# Patient Record
Sex: FEMALE | Race: WHITE | Employment: UNEMPLOYED | URBAN - METROPOLITAN AREA
[De-identification: names, ages, dates, MRNs, and addresses within clinical notes are randomized per-mention and may not be internally consistent; named-entity substitution may affect disease eponyms.]

---

## 2017-08-14 ENCOUNTER — HOSPITAL ENCOUNTER (OUTPATIENT)
Dept: PHYSICAL THERAPY | Age: 52
Discharge: HOME | End: 2017-08-14

## 2017-08-14 ENCOUNTER — HOSPITAL ENCOUNTER (OUTPATIENT)
Dept: NON INVASIVE DIAGNOSTICS | Age: 52
Discharge: HOME | End: 2017-08-14
Attending: EMERGENCY MEDICINE

## 2017-08-14 DIAGNOSIS — I44.7 OTHER LEFT BUNDLE BRANCH BLOCK: ICD-10-CM

## 2017-08-14 PROCEDURE — 97163 PT EVAL HIGH COMPLEX 45 MIN: CPT | Performed by: PHYSICAL THERAPIST

## 2017-08-14 PROCEDURE — 93306 TTE W/DOPPLER COMPLETE: CPT
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
## 2017-08-17 ENCOUNTER — HOSPITAL ENCOUNTER (OUTPATIENT)
Dept: PHYSICAL THERAPY | Age: 52
Discharge: HOME | End: 2017-08-17

## 2017-08-17 PROCEDURE — 97110 THERAPEUTIC EXERCISES: CPT

## 2017-08-17 NOTE — PROGRESS NOTES
PT DAILY TREATMENT NOTE - [facility]     Patient Name: Jt King  Date:2017  : 1965  [x]  Patient  Verified  Payor: /    In time:525  Out time:605  Total Treatment Time (min): 40  Total Timed Codes (min): 30  1:1 Treatment Time ( [address] only): na   Visit #: 2 of 16    Treatment Area: Pain in right knee [M25.561]    SUBJECTIVE  Pain Level (0-10 scale): 5,   Any medication changes, allergies to medications, adverse drug reactions, diagnosis change, or new procedure performed?: [x] No    [] Yes (see summary sheet for update)  Subjective functional status/changes:     [x] No changes reported  Active with HEP.     OBJECTIVE    Modality rationale: decrease pain    Min Type Additional Details    [] Estim:  []Unatt       []IFC  []Premod                        []Other:  []w/ice   []w/heat  Position:  Location:    [] Estim: []Att    []TENS instruct  []NMES                    []Other:  []w/US   []w/ice   []w/heat  Position:  Location:    []  Traction: [] Cervical       []Lumbar                       [] Prone          []Supine                       []Intermittent   []Continuous Lbs:  [] before manual  [] after manual    []  Ultrasound: []Continuous   [] Pulsed                           []1MHz   []3MHz W/cm2:  Location:    []  Iontophoresis with dexamethasone         Location: [] Take home patch   [] In clinic   10 [x]  Ice     []  heat  []  Ice massage  []  Laser   []  Anodyne Position:supine  Location:R knee    []  Laser with stim  []  Other:  Position:  Location:    []  Vasopneumatic Device Pressure:       [] lo [] med [] hi   Temperature: [] lo [] med [] hi   [] Skin assessment post-treatment:  []intact []redness- no adverse reaction    []redness - adverse reaction:       35 min Therapeutic Exercise:  [] See flow sheet :   Rationale: increase ROM, increase strength and improve coordination            With   [] TE   [] TA   [] neuro   [] other: Patient Education: [x] Review HEP    [] Progressed/Changed HEP based on:   [] positioning   [] body mechanics   [] transfers   [] heat/ice application    [] other:      Other Objective/Functional Measures:   none     Pain Level (0-10 scale) post treatment: 0 (numb)    ASSESSMENT/Changes in Function:   Audible clicking and pain with prone quad sets. Some clicking with other TE but no increased of pain    Patient will continue to benefit from skilled PT services to modify and progress therapeutic interventions, address functional mobility deficits, address ROM deficits and address strength deficits to attain remaining goals. [x]  See Plan of Care  []  See progress note/recertification  []  See Discharge Summary         Progress towards goals / Updated goals:  Short Term Goals: To be accomplished in 4 weeks:  1. Pt will be independent and compliant with HEP to achieve other goals  @Eval : pt not indep with HEP   Current: reports compliance    2. Pt Pain in LE and low back improve by 40% allowing increase ability to sleep and walk further distance   @ Eval : worse 10/10   Current:no change    3. Pt will improve LE strength by 1/4 step MMT noting improve control with functional mobility and ambulating stairs   @ Eval : Weak core, 4+/5 HS right , 4-/5 ankle eversion on right , ER bilateral 4/5 hips , IR 4+/5 Right   Current: NT    4. Pt FOTO score improve by >=7 points demonstrating improved functional mobility   @ Eval: 28/100      Long Term Goals: To be accomplished in 8 weeks:     1. Pt Pain in LE and low back improve by 75% allowing increase ability to sleep and walk further distance   @ Eval : worse 10/10    current: NT    2. Pt will improve LE strength to >=5/5 overall hip and knee and >4+/5 ankle eversion noting improve control with functional mobility and ambulating stairs   @ Eval : Weak core, 4+/5 HS right , 4-/5 ankle eversion on right , ER bilateral 4/5 hips , IR 4+/5 Right   current: NT    3.  Pt FOTO score improve by >=15 points demonstrating improved functional mobility @ Eval: 28/100  current: NT    4.  Pt will demonstrate ability to safely ambulate 600' over various terrain without loss of balance or knee giving out to demonstrate safety and improved balance  @ Eval:  gait is slow antalgic and pt notes decrease balance and fear of falling   current: NT       PLAN  [x]  Upgrade activities as tolerated     [x]  Continue plan of care  []  Update interventions per flow sheet       []  Discharge due to:_  []  Other:_      Keanu Warren PTA 8/17/2017  5:29 PM    Future Appointments  Date Time Provider Eduardo Ludwig   8/17/2017 5:30 PM Keanu Warren PTA [facility]   8/21/2017 4:30 PM Estella Orosco PT [facility]   8/30/2017 5:00 PM Osmin Sparrow PT [facility]   9/1/2017 8:30 AM Osmin Sparrow PT [facility]   9/5/2017 8:30 AM Keanu Warren PTA [facility]   9/7/2017 8:30 AM Osmin Sparrow PT [facility]

## 2017-08-21 ENCOUNTER — APPOINTMENT (OUTPATIENT)
Dept: PHYSICAL THERAPY | Age: 52
End: 2017-08-21

## 2017-08-30 ENCOUNTER — HOSPITAL ENCOUNTER (OUTPATIENT)
Dept: PHYSICAL THERAPY | Age: 52
Discharge: HOME | End: 2017-08-30

## 2017-08-30 PROCEDURE — 97110 THERAPEUTIC EXERCISES: CPT

## 2017-08-30 PROCEDURE — 97112 NEUROMUSCULAR REEDUCATION: CPT

## 2017-08-30 NOTE — PROGRESS NOTES
PT DAILY TREATMENT NOTE     Patient Name: Ofe Silver  Date:2017  : 1965  [x]  Patient  Verified  Payor: /    In time:508  Out time:558  Total Treatment Time (min): 50  Visit #: 3 of 16    Treatment Area: Pain in right knee [M25.561]    SUBJECTIVE  Pain Level (0-10 scale): 5  Any medication changes, allergies to medications, adverse drug reactions, diagnosis change, or new procedure performed?: [x] No    [] Yes (see summary sheet for update)  Subjective functional status/changes:   [] No changes reported  \"He said he was going to fax over getting a brace and TENS unit. \"    OBJECTIVE    30 min Therapeutic Exercise:  [x] See flow sheet :   Rationale: increase ROM, increase strength and improve coordination to improve the patients ability to perform ADL's with reduced pain levels. 20 min Neuromuscular Re-education:  [x]  See flow sheet :   Rationale: increase ROM, increase strength, improve coordination, improve balance and increase proprioception  to improve the patients ability to perform ADL's with reduced pain levels. With   [] TE   [] TA   [] neuro   [] other: Patient Education: [x] Review HEP    [] Progressed/Changed HEP based on:   [] positioning   [] body mechanics   [] transfers   [] heat/ice application    [] other:      Other Objective/Functional Measures:  Good TA engagement and PPT    Pain Level (0-10 scale) post treatment: 5    ASSESSMENT/Changes in Function: Pt tolerated session fairly well. Pt had difficulty understanding reasoning behind TA exercises, attempted to explain purpose of PPT and importance of TA recruitment but pt had significant difficulty understanding. Very challenged with 3-way hip. Pt to trial TENS for pain and MD recommended stabilizing brace- to look into for future appointments. Pt had low back spasms following activities, had pt perform LTR and SKTC with improved symptoms following.      Patient will continue to benefit from skilled PT services to modify and progress therapeutic interventions, address functional mobility deficits, address ROM deficits, address strength deficits, analyze and address soft tissue restrictions, analyze and cue movement patterns, analyze and modify body mechanics/ergonomics, assess and modify postural abnormalities and instruct in home and community integration to attain remaining goals. []  See Plan of Care  []  See progress note/recertification  []  See Discharge Summary         Progress towards goals / Updated goals:  Short Term Goals: To be accomplished in 4 weeks:  1. Pt will be independent and compliant with HEP to achieve other goals  @Eval : pt not indep with HEP   Current: reports compliance     2. Pt Pain in LE and low back improve by 40% allowing increase ability to sleep and walk further distance   @ Eval : worse 10/10   Current:no change  Current: continues with pain     3. Pt will improve LE strength by 1/4 step MMT noting improve control with functional mobility and ambulating stairs   @ Eval : Weak core, 4+/5 HS right , 4-/5 ankle eversion on right , ER bilateral 4/5 hips , IR 4+/5 Right   Current: NT     4. Pt FOTO score improve by >=7 points demonstrating improved functional mobility   @ Eval: 28/100        Long Term Goals: To be accomplished in 8 weeks:      1. Pt Pain in LE and low back improve by 75% allowing increase ability to sleep and walk further distance   @ Eval : worse 10/10    current: NT     2.Pt will improve LE strength to >=5/5 overall hip and knee and >4+/5 ankle eversion noting improve control with functional mobility and ambulating stairs   @ Eval : Weak core, 4+/5 HS right , 4-/5 ankle eversion on right , ER bilateral 4/5 hips , IR 4+/5 Right   current: NT     3.  Pt FOTO score improve by >=15 points demonstrating improved functional mobility   @ Eval: 28/100  current: NT     4. Pt will demonstrate ability to safely ambulate 600' over various terrain without loss of balance or knee giving out to demonstrate safety and improved balance  @ Eval:  gait is slow antalgic and pt notes decrease balance and fear of falling   current: NT    PLAN  []  Upgrade activities as tolerated     [x]  Continue plan of care  []  Update interventions per flow sheet       []  Discharge due to:_  []  Other:_      Deepti Funk, PT 8/30/2017  5:08 PM    Future Appointments  Date Time Provider Eduardo Ludwig   9/1/2017 8:30 AM Devin Rodriguez PTA [facility]   9/5/2017 8:30 AM Devin Rodriguez PTA [facility]   9/7/2017 8:30 AM Adriel Cavazos, LILIYA [facility]

## 2017-09-01 ENCOUNTER — HOSPITAL ENCOUNTER (OUTPATIENT)
Dept: PHYSICAL THERAPY | Age: 52
Discharge: HOME | End: 2017-09-01

## 2017-09-01 PROCEDURE — 97110 THERAPEUTIC EXERCISES: CPT

## 2017-09-01 PROCEDURE — 97014 ELECTRIC STIMULATION THERAPY: CPT

## 2017-09-01 NOTE — PROGRESS NOTES
PT DAILY TREATMENT NOTE - [facility]     Patient Name: Iqra Vivas  Date:2017  : 1965  [x]  Patient  Verified  Payor: /    In time:833  Out time:933  Total Treatment Time (min): 60  Total Timed Codes (min): 45  1:1 Treatment Time ( only): na   Visit #: 4 of 16    Treatment Area: Pain in right knee [M25.561]    SUBJECTIVE  Pain Level (0-10 scale): 5  Any medication changes, allergies to medications, adverse drug reactions, diagnosis change, or new procedure performed?: [x] No    [] Yes (see summary sheet for update)  Subjective functional status/changes:   [] No changes reported  Would like to try estim today  States that she has tried braces in the past, \"I cant find one small enough or supportive enough\"    OBJECTIVE    Modality rationale: decrease pain   Min Type Additional Details    [] Estim:  []Unatt       []IFC  []Premod                        []Other:  []w/ice   []w/heat  Position:  Location:    [] Estim: []Att    []TENS instruct  []NMES                    []Other:  []w/US   []w/ice   []w/heat  Position:  Location:    []  Traction: [] Cervical       []Lumbar                       [] Prone          []Supine                       []Intermittent   []Continuous Lbs:  [] before manual  [] after manual    []  Ultrasound: []Continuous   [] Pulsed                           []1MHz   []3MHz W/cm2:  Location:    []  Iontophoresis with dexamethasone         Location: [] Take home patch   [] In clinic    []  Ice     []  heat  []  Ice massage  []  Laser   []  Anodyne Position:  Location:    []  Laser with stim  []  Other:  Position:  Location:    []  Vasopneumatic Device Pressure:       [] lo [] med [] hi   Temperature: [] lo [] med [] hi   [] Skin assessment post-treatment:  []intact []redness- no adverse reaction    []redness - adverse reaction:       45 min Therapeutic Exercise:  [] See flow sheet :   Rationale: increase ROM, increase strength and improve coordination       With   [] TE   [] TA   [] neuro   [] other: Patient Education: [x] Review HEP    [] Progressed/Changed HEP based on:   [] positioning   [] body mechanics   [] transfers   [] heat/ice application    [] other:      Other Objective/Functional Measures:   none     Pain Level (0-10 scale) post treatment: 0  ASSESSMENT/Changes in Function:   Fair tolerance to TE with improved coordination with core stab exercises. Good response to estim, c/o limited sensation in lower leg possibly limiting effectiveness, but reports no pain post estim      Patient will continue to benefit from skilled PT services to modify and progress therapeutic interventions, address functional mobility deficits, address ROM deficits, address strength deficits and analyze and address soft tissue restrictions to attain remaining goals. [x]  See Plan of Care  []  See progress note/recertification  []  See Discharge Summary         Progress towards goals / Updated goals:  Short Term Goals: To be accomplished in 4 weeks:  1. Pt will be independent and compliant with HEP to achieve other goals  @Eval : pt not indep with HEP   Current: reports compliance      2. Pt Pain in LE and low back improve by 40% allowing increase ability to sleep and walk further distance   @ Eval : worse 10/10   Current:no change  Current: continues with pain      3. Pt will improve LE strength by 1/4 step MMT noting improve control with functional mobility and ambulating stairs   @ Eval : Weak core, 4+/5 HS right , 4-/5 ankle eversion on right , ER bilateral 4/5 hips , IR 4+/5 Right   Current: NT      4. Pt FOTO score improve by >=7 points demonstrating improved functional mobility   @ Eval: 28/100  Current:NT          Long Term Goals: To be accomplished in 8 weeks:      1. Pt Pain in LE and low back improve by 75% allowing increase ability to sleep and walk further distance   @ Eval : worse 10/10    current: NT      2. Pt will improve LE strength to >=5/5 overall hip and knee and >4+/5 ankle eversion noting improve control with functional mobility and ambulating stairs   @ Eval : Weak core, 4+/5 HS right , 4-/5 ankle eversion on right , ER bilateral 4/5 hips , IR 4+/5 Right   current: NT      3. Pt FOTO score improve by >=15 points demonstrating improved functional mobility   @ Eval: 28/100  current: NT      4.  Pt will demonstrate ability to safely ambulate 600' over various terrain without loss of balance or knee giving out to demonstrate safety and improved balance  @ Eval:  gait is slow antalgic and pt notes decrease balance and fear of falling   current: NT    PLAN  [x]  Upgrade activities as tolerated     [x]  Continue plan of care  []  Update interventions per flow sheet       []  Discharge due to:_  []  Other:_      Nohelia Jackson PTA 9/1/2017  8:57 AM    Future Appointments  Date Time Provider Eduardo Ludwig   9/5/2017 8:30 AM Nohel[place] [facility] [place]   9/7/2017 8:30 AM [place]ley [place], [place] [facility] [place]

## 2017-09-05 ENCOUNTER — HOSPITAL ENCOUNTER (OUTPATIENT)
Dept: PHYSICAL THERAPY | Age: 52
Discharge: HOME | End: 2017-09-05

## 2017-09-05 PROCEDURE — 97014 ELECTRIC STIMULATION THERAPY: CPT

## 2017-09-05 PROCEDURE — 97110 THERAPEUTIC EXERCISES: CPT

## 2017-09-07 ENCOUNTER — HOSPITAL ENCOUNTER (OUTPATIENT)
Dept: PHYSICAL THERAPY | Age: 52
Discharge: HOME | End: 2017-09-07

## 2017-09-07 PROCEDURE — 97110 THERAPEUTIC EXERCISES: CPT | Performed by: PHYSICAL THERAPIST

## 2017-09-07 PROCEDURE — 97014 ELECTRIC STIMULATION THERAPY: CPT | Performed by: PHYSICAL THERAPIST

## 2017-09-07 NOTE — PROGRESS NOTES
PT DAILY TREATMENT NOTE      Patient Name: Lindsey Nunez  Date:2017  : 1965  [x]  Patient  Verified  Payor: /     In time: 8:30  Out time: 9:30  Total Treatment Time (min): 55  Visit #: 6 of 16    Treatment Area: Pain in right knee [M25.561]    SUBJECTIVE  Pain Level (0-10 scale): 6  Any medication changes, allergies to medications, adverse drug reactions, diagnosis change, or new procedure performed?: [x] No    [] Yes (see summary sheet for update)  Subjective functional status/changes:   [] No changes reported  I take tylenol. OBJECTIVE    Modality rationale: decrease edema, decrease inflammation, decrease pain and increase tissue extensibility to improve the patients ability to improve mobility    Min Type Additional Details    [] Estim:  []Unatt       []IFC  []Premod                        []Other:  []w/ice   []w/heat  Position:  Location:   15 [x] Estim: []Att    []TENS instruct  []NMES                    []Other: Premod 11 volts with quad sets  []w/US   []w/ice   [x]w/heat  Position: seated   Location: right knee. []  Traction: [] Cervical       []Lumbar                       [] Prone          []Supine                       []Intermittent   []Continuous Lbs:  [] before manual  [] after manual    []  Ultrasound: []Continuous   [] Pulsed                           []1MHz   []3MHz W/cm2:  Location:    []  Iontophoresis with dexamethasone         Location: [] Take home patch   [] In clinic    []  Ice     []  heat  []  Ice massage  []  Laser   []  Anodyne Position:  Location:    []  Laser with stim  []  Other:  Position:  Location:    []  Vasopneumatic Device Pressure:       [] lo [] med [] hi   Temperature: [] lo [] med [] hi   [x] Skin assessment post-treatment:  [x]intact [x]redness- no adverse reaction    []redness - adverse reaction:     40 min Therapeutic Exercise:  [x] See flow sheet :focused on LE flexibility and strength training.     Rationale: increase ROM, increase strength, improve coordination and improve balance to improve the patients ability to improve mobility            With   [x] TE   [] TA   [] neuro   [] other: Patient Education: [x] Review HEP    [x] Progressed/Changed HEP based on:   [] positioning   [] body mechanics   [] transfers   [] heat/ice application    [] other: discussed fibromyalgia s/sx and her overall fear of standing on her right leg. Pt discussed possible need for surgery. Other Objective/Functional Measures: Hyperextension in the knee with stretching, however pt keeps knee flexed when attempting to do stabilization there-ex. Pain Level (0-10 scale) post treatment: 4    ASSESSMENT/Changes in Function:  pt tolerated session fair with constant cues needed to improve posture and to guard for balance. Pt needed constant cues to prevent poor ability to stabilize leg for balance training due to wanting to keep the knee bent with there-ex. Pt performed quad sets with Pre-mod due to complaint with having more instability in the legs. Pt would do abnormal limp with certain exercises and encouraged to eliminate to prevent her current complaint of shin pain. Pt may need knee MRI if pain persists. Patient will continue to benefit from skilled PT services to modify and progress therapeutic interventions, address functional mobility deficits, address ROM deficits, address strength deficits, analyze and address soft tissue restrictions, analyze and cue movement patterns, analyze and modify body mechanics/ergonomics, assess and modify postural abnormalities and address imbalance/dizziness to attain remaining goals. []  See Plan of Care  []  See progress note/recertification  []  See Discharge Summary         Progress towards goals / Updated goals:  Short Term Goals: To be accomplished in 4 weeks:  1. Pt will be independent and compliant with HEP to achieve other goals  @Eval : pt not indep with HEP   Current: reports I and compliance       2.  Pt Pain in LE and low back improve by 40% allowing increase ability to sleep and walk further distance   @ Eval : worse 10/10   Current: no change    3. Pt will improve LE strength by 1/4 step MMT noting improve control with functional mobility and ambulating stairs   @ Eval : Weak core, 4+/5 HS right , 4-/5 ankle eversion on right , ER bilateral 4/5 hips , IR 4+/5 Right   Current: NT      4. Pt FOTO score improve by >=7 points demonstrating improved functional mobility   @ Eval: 28/100  Current:26/100 down by 2       PLAN  [x]  Upgrade activities as tolerated     [x]  Continue plan of care  []  Update interventions per flow sheet       []  Discharge due to:_  [x]  Other:_      Patsy Manzanares, PT 9/7/2017  8:35 AM    No future appointments.

## 2017-09-11 ENCOUNTER — APPOINTMENT (OUTPATIENT)
Dept: PHYSICAL THERAPY | Age: 52
End: 2017-09-11

## 2017-09-14 ENCOUNTER — APPOINTMENT (OUTPATIENT)
Dept: PHYSICAL THERAPY | Age: 52
End: 2017-09-14

## 2017-09-18 ENCOUNTER — HOSPITAL ENCOUNTER (OUTPATIENT)
Dept: PHYSICAL THERAPY | Age: 52
Discharge: HOME | End: 2017-09-18

## 2017-09-18 PROCEDURE — 97110 THERAPEUTIC EXERCISES: CPT | Performed by: PHYSICAL THERAPIST

## 2017-09-18 NOTE — PROGRESS NOTES
PT DAILY TREATMENT NOTE     Patient Name: Keanu Subramanian  BRR  : 1965  [x]  Patient  Verified  Payor: /    In time:5:15  Out time:6:15  Total Treatment Time (min): 60  Visit #: 7 of 16    Treatment Area: Pain in right knee [M25.561]    SUBJECTIVE  Pain Level (0-10 scale): 6  Any medication changes, allergies to medications, adverse drug reactions, diagnosis change, or new procedure performed?: [x] No    [] Yes (see summary sheet for update)  Subjective functional status/changes:   [] No changes reported  I feel the therapy is not helping. The pain is still the same. I feel in the past the PT helped but this time it has not. OBJECTIVE    Modality rationale: Declined ( will ice later)    Min Type Additional Details    [] Estim:  []Unatt       []IFC  []Premod                        []Other:  []w/ice   []w/heat  Position:  Location:    [] Estim: []Att    []TENS instruct  []NMES                    []Other:  []w/US   []w/ice   []w/heat  Position:  Location:    []  Traction: [] Cervical       []Lumbar                       [] Prone          []Supine                       []Intermittent   []Continuous Lbs:  [] before manual  [] after manual    []  Ultrasound: []Continuous   [] Pulsed                           []1MHz   []3MHz W/cm2:  Location:    []  Iontophoresis with dexamethasone         Location: [] Take home patch   [] In clinic    []  Ice     []  heat  []  Ice massage  []  Laser   []  Anodyne Position:  Location:    []  Laser with stim  []  Other:  Position:  Location:    []  Vasopneumatic Device Pressure:       [] lo [] med [] hi   Temperature: [] lo [] med [] hi   [] Skin assessment post-treatment:  []intact []redness- no adverse reaction    []redness - adverse reaction:      15  min []Eval                  [x]Re-Eval       45 min Therapeutic Exercise:  [x] See flow sheet : HEP reviewed and pt demonstrated safely.     Rationale: increase ROM, increase strength, improve coordination, improve balance and increase proprioception to improve the patients ability to improve mobility and provide carryover post PT. With   [x] TE   [] TA   [] neuro   [] other: Patient Education: [x] Review HEP    [x] Progressed/Changed HEP based on:   [] positioning   [] body mechanics   [] transfers   [] heat/ice application    [] other: issued and demonstrated safely     Other Objective/Functional Measures: ramses LE AROM WFL,  Trunk AROM WFL  ramses LE MMT 4+-5/5. Right HS grossly 4+-5-/5 due to report of pain. SLR test:  (-)  Slump N test: (-)  Sensation: intact to light touch. DTR's: 2+     Pain Level (0-10 scale) post treatment: 6    ASSESSMENT/Changes in Function: Pt has reached level that has not improved thus far. Pt has pain     Patient will continue to benefit from skilled PT services to modify and progress therapeutic interventions, address functional mobility deficits, address ROM deficits, address strength deficits, analyze and address soft tissue restrictions, analyze and cue movement patterns, analyze and modify body mechanics/ergonomics, assess and modify postural abnormalities and address imbalance/dizziness to attain remaining goals. []  See Plan of Care  []  See progress note/recertification  [x]  See Discharge Summary         Progress towards goals / Updated goals:  See PN     PLAN  []  Upgrade activities as tolerated     []  Continue plan of care  []  Update interventions per flow sheet       [x]  Discharge due to:_goals not met, per pt no improvements.    []  Other:_      John Russo, PT 9/18/2017  5:06 PM    Future Appointments  Date Time Provider Eduardo Ludwig   9/18/2017 5:30 PM Anette Gardner [facility]

## 2017-09-18 NOTE — PROGRESS NOTES
[facility]  [address] [place], [address]  Ph [phone]  Fx [phone]    Physical Therapy Progress Note/Discharge Note  Patient name: Lindsey Nunez Start of Care: 2017   Referral source: Fili Herrera MD : 1965   Medical/Treatment Diagnosis: Pain in right knee [M25.561] Onset Date:     Prior Hospitalization: see medical history Provider#: [id number]   Medications: Verified on Patient Summary List    Comorbidities: fibromyalgia , arthritis , chronic knee right leg pain with fx in ankle ? And ORIF  ( then removal of hardware   ) to right tibia in  , c/o back to neck pain , right shoulder pain , depression  Successful PT in    Prior Level of Function: prior to accident 2008 indep no pain full function      Visits from Start of Care: 6    Missed Visits: 4    Established Goals:         Excellent           Good         Limited           None  [x] Increased ROM   []  []  [x]  []  [x] Increased Strength   []  []  [x]  []  [x] Increased Mobility   []  []  [x]  []   [] Decreased Pain   []  []  [x]  []  [] Decreased Swelling  []  []  []  [x]    Key Functional Changes:  Pt has made minimal increased in strength of LE, however MMT is 5/5 in ramses LE, however with sustained MMT the right HS is 4+-5-/5 due to pain. Pt has moderate pain that does not appear to change with there-ex. Pt c/o abnormal s/sx of falling and leg buckling that does not appear to be muscular, however lower back screening were negative for disc or nerve pathology. Pt states therapy has helped in the past, however she reports completing her HEP is challenging to complete due to having to care for a sick parent and dealing with her family concerns. Pt also appear to needed Psych consult for stress management and or pain management. Pt is I with safe with HEP. If s/sx of leg buckling continues, PT will recommend a neuro consult as well and pt is to f/u with MD at this time.      Short Term Goals: To be accomplished in 4 weeks:  1. Pt will be independent and compliant with HEP to achieve other goals  @Eval : pt not indep with HEP   Current: MET, issued and pt reports I and compliance       2. Pt Pain in LE and low back improve by 40% allowing increase ability to sleep and walk further distance   @ Eval : worse 10/10   Current: Not Met, no change     3. Pt will improve LE strength by 1/4 step MMT noting improve control with functional mobility and ambulating stairs   @ Eval : Weak core, 4+/5 HS right , 4-/5 ankle eversion on right , ER bilateral 4/5 hips , IR 4+/5 Right   Current: MET,  Left LE 5/5. Right HS 4+-5-/5 all others 5/5.       4.  Pt FOTO score improve by >=7 points demonstrating improved functional mobility   @ Eval: 28/100  Current: Not met, 33/100 up by 5      ASSESSMENT/RECOMMENDATIONS:  [x]Discontinue therapy due to lack of appreciable progress towards goals  [x]Discontinue therapy due to lack of attendance or compliance  []Await Physician's recommendations/decisions regarding therapy  [x]Other:_pain management possible MRI for abnormal intermittently leg buckling    Thank you for this referral.   Bobbi Gutierrez, PT 9/18/2017 5:07 PM    NOTE TO PHYSICIAN:  PLEASE COMPLETE THE ORDERS BELOW AND   FAX TO [facility]: [phone]  If you are unable to process this request in 24 hours please contact our office: [phone]      ____ I have read the above report and request that my patient continue therapy with the following changes/special instructions:  ____ I have read the above report and request that my patient be discharged from therapy    Physician's Signature:_____________________ Date:___________Time:__________

## 2018-01-25 ENCOUNTER — APPOINTMENT (OUTPATIENT)
Dept: PHYSICAL THERAPY | Age: 53
End: 2018-01-25

## 2018-01-31 ENCOUNTER — HOSPITAL ENCOUNTER (OUTPATIENT)
Dept: PHYSICAL THERAPY | Age: 53
Discharge: HOME | End: 2018-01-31

## 2018-01-31 PROCEDURE — 97530 THERAPEUTIC ACTIVITIES: CPT

## 2018-01-31 PROCEDURE — 97162 PT EVAL MOD COMPLEX 30 MIN: CPT

## 2018-01-31 PROCEDURE — 97110 THERAPEUTIC EXERCISES: CPT

## 2018-01-31 NOTE — PROGRESS NOTES
[facility] at [address] [place] [address]  Phone: [phone]   Fax: [phone]    Plan of Care/ Statement of Necessity for Physical Therapy Services     Patient name: Whit Mock Start of Care: 2018   Referral source: Cheikh Buchanan MD : 1965    Medical Diagnosis: Right shoulder pain [M25.511]   Onset Date:summer 2017    Treatment Diagnosis: right shoulder pain   Prior Hospitalization: see medical history Provider#: [id number]   Medications: Verified on Patient summary List    Comorbidities: heart disease, fibromyalgia   Prior Level of Function: normal activity, no deficits right UE    The Plan of Care and following information is based on the information from the initial evaluation. Assessment/ key information: Pt is a 48 yo female presenting to clinic c/o right shoulder pain after injuring shoulder summer of last year lifting a mattress to change linens. She has received 2 injections, reporting symptom improvement after 2nd injection several weeks ago. Pt right hand dominant. Pain can wake pt up at night or keep her from sleeping. On exam, pt has decreased right shoulder A/PROM, decreased right shoulder strength and decreased functional use of right UE. Signs/symptoms consistent with RC involvement. Evaluation Complexity History MEDIUM  Complexity : 1-2 comorbidities / personal factors will impact the outcome/ POC ; Examination MEDIUM Complexity : 3 Standardized tests and measures addressing body structure, function, activity limitation and / or participation in recreation  ;Presentation MEDIUM Complexity : Evolving with changing characteristics  ; Clinical Decision Making MEDIUM Complexity : FOTO score of 26-74  Overall Complexity Rating: MEDIUM  Problem List: pain affecting function, decrease ROM, decrease strength, decrease ADL/ functional abilitiies, decrease activity tolerance and decrease flexibility/ joint mobility   Treatment Plan may include any combination of the following: Therapeutic exercise, Therapeutic activities, Neuromuscular re-education, Physical agent/modality, Manual therapy and Patient education  Patient / Family readiness to learn indicated by: asking questions, trying to perform skills and interest  Persons(s) to be included in education: patient (P)  Barriers to Learning/Limitations: None  Patient Goal (s): get rid of pain  Patient Self Reported Health Status: good  Rehabilitation Potential: good    Short Term Goals: To be accomplished in 2 weeks:  1. Patient will be independent and compliant with HEP to achieve other goals. Status at eval: issued and reviewed HEP  2. Increase right shoulder PROM by 20 degrees for ABD and IR to restore normal joint mobility for ADL's. Status at kim: ABD: 150, IR: 60  Long Term Goals: To be accomplished in 4 weeks:  1. Improve FOTO score >/= 67/100 to indicate decreased pain with ADL's  Status at eval: 54/100  2. Increase right shoulder AROM flex >/= 170 to reach overhead with decreased difficulty. Status at eval: 155  3. Increase right shoulder AROM ER >/= 60 to normalize function. Status at eval: 40 (@0)  4. Increase right shoulder AROM IR >/= T9 level to reach behind back with decreased difficulty. Status at eval: L2 level    Frequency / Duration: Patient to be seen 3 times per week for 4 weeks. Patient/ Caregiver education and instruction: Diagnosis, prognosis, activity modification and exercises   [x]  Plan of care has been reviewed with ALONSO Hazel, PT 1/31/2018 1:32 PM  _____________________________________________________________________  I certify that the above Therapy Services are being furnished while the patient is under my care. I agree with the treatment plan and certify that this therapy is necessary.     Physician's Signature:____________________  Date:__________Time:______    Please sign and return to [facility] Marge [address] [place], [address] [place] [address]  Phone: [phone]   Fax: [phone]

## 2018-01-31 NOTE — PROGRESS NOTES
PT DAILY TREATMENT NOTE     Patient Name: Placido Pollock  FEQK:[id number]  : 1965  [x]  Patient  Verified  Payor: /    In time:1235  Out time:125  Total Treatment Time (min): 54  Visit #: 1 of 12    Treatment Area: Right shoulder pain [M25.511]    SUBJECTIVE  Pain Level (0-10 scale): 0/10 seated at rest Recent max: 10/10  Any medication changes, allergies to medications, adverse drug reactions, diagnosis change, or new procedure performed?: [x] No    [] Yes (see summary sheet for update)  Subjective functional status/changes:   [] No changes reported  See POC    OBJECTIVE    Modality rationale:    Min Type Additional Details    [] Estim:  []Unatt       []IFC  []Premod                        []Other:  []w/ice   []w/heat  Position:  Location:    [] Estim: []Att    []TENS instruct  []NMES                    []Other:  []w/US   []w/ice   []w/heat  Position:  Location:    []  Traction: [] Cervical       []Lumbar                       [] Prone          []Supine                       []Intermittent   []Continuous Lbs:  [] before manual  [] after manual    []  Ultrasound: []Continuous   [] Pulsed                           []1MHz   []3MHz W/cm2:  Location:    []  Iontophoresis with dexamethasone         Location: [] Take home patch   [] In clinic    []  Ice     []  heat  []  Ice massage  []  Laser   []  Anodyne Position:  Location:    []  Laser with stim  []  Other:  Position:  Location:    []  Vasopneumatic Device Pressure:       [] lo [] med [] hi   Temperature: [] lo [] med [] hi   [] Skin assessment post-treatment:  []intact []redness- no adverse reaction    []redness - adverse reaction:     30 min [x]Eval                  []Re-Eval       10 min Therapeutic Exercise:  [x] See flow sheet : issued and reviewed HEP   Rationale: increase ROM and increase strength to improve the patients ability to right UE with ADL's    15 min Therapeutic Activity:  []  See flow sheet : discussed positions to limit/avoid given nature/pathology of condition   Rationale: decrease pain, prevent further injury  to improve the patients ability to utilize right UE with ADL's      min Neuromuscular Re-education:  []  See flow sheet :        min Manual Therapy:          min Gait Training:  ___ feet with ___ device on level surfaces with ___ level of assist   Rationale: With   [] TE   [] TA   [] neuro   [] other: Patient Education: [x] Review HEP    [] Progressed/Changed HEP based on:   [] positioning   [] body mechanics   [] transfers   [] heat/ice application    [] other:      Other Objective/Functional Measures:   Physical Therapy Evaluation - Shoulder  Pt c/o right shoulder pain after injuring shoulder summer of last year lifting a mattress to change linens. She has received 2 injections, reporting symptom improvement after 2nd injection several weeks ago. Pt right hand dominant. Pain can wake pt up at night or keep her from sleeping.   MRI: multiple tears per pt report  PLOF:  Present Functional Limitations: showering, dressing, lifting    Posture: [] Poor    [x] Fair    [] Good    Describe:    ROM:  [] Unable to assess at this time                                           AROM                                                              PROM   Left Right  Left Right   Flexion 180 155 Flexion  170   Extension   Extension     Scaption/ 140 Scaption/ABD  150   ER @ 0 Degrees  40 ER @ 0 Degrees     ER @ 90 Degrees 90  ER @ 90 Degrees  90   IR @ 90 Degrees T6 level L2 level IR @ 90 Degrees  60     End Feel / Painful Arc:    Strength:   [] Unable to assess at this time                                                                            L (1-5) R (1-5) Pain   Flexors   [] Yes   [] No   Abductors   [] Yes   [] No   External Rotators   [] Yes   [] No   Internal Rotators   [] Yes   [] No   Supraspinatus   [] Yes   [] No   Serratus Anterior   [] Yes   [] No   Lower Trapezius   [] Yes   [] No   Elbow Flexion   [] Yes   [] No Elbow Extension   [] Yes   [] No       Scapulohumoral Control / Rhythm:  Able to eccentrically lower with good control? Left: [x] Yes   [] No     Right: [] Yes   [x] No    Accessory Motions:    Palpation  [] Min  [] Mod  [] Severe    Location:  [] Min  [] Mod  [] Severe    Location:  [] Min  [] Mod  [] Severe    Location:    Optional Tests:    Sensation Left Right Reflexes Left Right   Biceps (C5)   Biceps (C5)     Omalley Radial(C6-7)   Brachioradialis (C6)     Omalley Ulnar(C8-T1)   Triceps (C7)       Adson's Test  [] Pos   [] Neg Yergason's Test [] Pos   [] Neg  Edith's Test  [] Pos   [] Neg Sallie's Sign [] Pos   [] Neg  Neer's Test  [] Pos   [] Neg Clunk Test  [] Pos   [] Neg  Hawkin's Test  [] Pos   [] Neg AC Joint  [] Pos   [] Neg  Speed's Test  [] Pos   [] Neg SC Joint  [] Pos   [] Neg  Empty Can  [] Pos   [] Neg Pectoral Tightness [] Pos   [] Neg  Anterior Apprehension [] Pos   [] Neg   Posterior Apprehension [] Pos   [] Neg      Other Tests / Comments:        Pain Level (0-10 scale) post treatment: 0/10    ASSESSMENT/Changes in Function: see POC    Patient will continue to benefit from skilled PT services to modify and progress therapeutic interventions, address ROM deficits, address strength deficits, analyze and address soft tissue restrictions, analyze and cue movement patterns, analyze and modify body mechanics/ergonomics and assess and modify postural abnormalities to attain remaining goals. [x]  See Plan of Care  []  See progress note/recertification  []  See Discharge Summary         Progress towards goals / Updated goals:  See POC      PLAN  [x]  Upgrade activities as tolerated     [x]  Continue plan of care  []  Update interventions per flow sheet       []  Discharge due to:_  [x]  Other: ROM/stretching, strengthening/stability, mod prn, manual techniques      Surjit Cunha, PT 1/31/2018  12:35 PM    No future appointments.

## 2018-02-02 ENCOUNTER — HOSPITAL ENCOUNTER (OUTPATIENT)
Dept: PHYSICAL THERAPY | Age: 53
Discharge: HOME | End: 2018-02-02
Payer: SELF-PAY

## 2018-02-02 PROCEDURE — 97140 MANUAL THERAPY 1/> REGIONS: CPT

## 2018-02-02 PROCEDURE — 97112 NEUROMUSCULAR REEDUCATION: CPT

## 2018-02-02 NOTE — PROGRESS NOTES
PT DAILY TREATMENT NOTE     Patient Name: Gus Brasher  NBMO:  : 1965  [x]  Patient  Verified  Payor: /    In time:148 Out time:230  Total Treatment Time (min): 42  Visit #: 2 of 12    Treatment Area: Right shoulder pain [M25.511]    SUBJECTIVE  Pain Level (0-10 scale): 0/10  Any medication changes, allergies to medications, adverse drug reactions, diagnosis change, or new procedure performed?: [x] No    [] Yes (see summary sheet for update)  Subjective functional status/changes:   [] No changes reported  Pt brought in her MRI and concerns that she may hurt herself more    OBJECTIVE    Modality rationale: decrease inflammation and decrease pain to improve the patients ability to complete ADLs   Min Type Additional Details    [] Estim:  []Unatt       []IFC  []Premod                        []Other:  []w/ice   []w/heat  Position:  Location:    [] Estim: []Att    []TENS instruct  []NMES                    []Other:  []w/US   []w/ice   []w/heat  Position:  Location:    []  Traction: [] Cervical       []Lumbar                       [] Prone          []Supine                       []Intermittent   []Continuous Lbs:  [] before manual  [] after manual    []  Ultrasound: []Continuous   [] Pulsed                           []1MHz   []3MHz W/cm2:  Location:    []  Iontophoresis with dexamethasone         Location: [] Take home patch   [] In clinic    []  Ice     []  heat  []  Ice massage  []  Laser   []  Anodyne Position:  Location:    []  Laser with stim  []  Other:  Position:  Location:   10 [x]  Vasopneumatic Device Pressure:       [] lo [x] med [] hi   Temperature: [] lo [x] med [] hi   [x] Skin assessment post-treatment:  [x]intact [x]redness- no adverse reaction    []redness  adverse reaction:          22 min Neuromuscular Re-education:  [x]  See flow sheet :facilitate scapular strength   Rationale: increase ROM, increase strength, improve coordination and increase proprioception  to improve the patients ability to complete ADLs    10 min Manual Therapy:  MFR to right shoulder, PROM, manual stretching, scap mobes   Rationale: decrease pain, increase ROM, increase tissue extensibility, decrease edema  and decrease trigger points to complete ADLs          With   [] TE   [] TA   [x] neuro   [] other: Patient Education: [x] Review HEP    [] Progressed/Changed HEP based on:   [] positioning   [x] body mechanics   [] transfers   [x] heat/ice application    [] other:      Other Objective/Functional Measures:     Pain Level (0-10 scale) post treatment: 0/10    ASSESSMENT/Changes in Function: Pt demonstrates limited scapular mobility with difficulty improving AROM of scapula. Pt tolerated all exercises and manual work with exception of scapular mobility    Patient will continue to benefit from skilled PT services to address functional mobility deficits, address ROM deficits, address strength deficits, analyze and address soft tissue restrictions, analyze and cue movement patterns, analyze and modify body mechanics/ergonomics and assess and modify postural abnormalities to attain remaining goals. []  See Plan of Care  []  See progress note/recertification  []  See Discharge Summary         Progress towards goals / Updated goals:  Short Term Goals: To be accomplished in 2 weeks:  1. Patient will be independent and compliant with HEP to achieve other goals. Status at eval: issued and reviewed HEP  Current: Compliant  2. Increase right shoulder PROM by 20 degrees for ABD and IR to restore normal joint mobility for ADL's. Status at kmi: ABD: 150, IR: 60  Long Term Goals: To be accomplished in 4 weeks:  1. Improve FOTO score >/= 67/100 to indicate decreased pain with ADL's  Status at eval: 54/100  2. Increase right shoulder AROM flex >/= 170 to reach overhead with decreased difficulty. Status at eval: 155  3. Increase right shoulder AROM ER >/= 60 to normalize function. Status at eval: 40 (@0)  4.  Increase right shoulder AROM IR >/= T9 level to reach behind back with decreased difficulty.   Status at eval: L2 level    PLAN  []  Upgrade activities as tolerated     []  Continue plan of care  []  Update interventions per flow sheet       []  Discharge due to:_  []  Other:_      Belita Noun, PTA 2/2/2018  12:17 PM    Future Appointments  Date Time Provider Eduardo Ludwig   2/2/2018 1:45 PM Belita Noun, PTA [facility]   2/7/2018 2:30 PM Dorita Sensor, PTA [facility]   2/8/2018 11:00 AM Belita Noun[facility]   2/9/2018 1:30 PM Dorita Sensor, PTA [facility]   2/12/2018 11:30 AM Lorra Jason, PT [facility]   2/15/2018 9:45 AM Belita Noun, PTA [facility]   2/16/2018 11:00 AM Belita Noun, PTA [facility]   2/19/2018 11:30 AM Stacye Alphonse, PT [facility]   2/20/2018 9:30 AM Lorra Jason, PT [facility]   2/23/2018 11:30 AM Dorita Sensor, PTA [facility]   2/26/2018 1:30 PM Belita Noun, PTA [facility]   2/28/2018 9:00 AM Lorra Jason, PT [facility]   3/1/2018 9:45 AM Belita Noun, PTA [facility]

## 2018-02-07 ENCOUNTER — HOSPITAL ENCOUNTER (OUTPATIENT)
Dept: PHYSICAL THERAPY | Age: 53
Discharge: HOME | End: 2018-02-07
Payer: SELF-PAY

## 2018-02-07 PROCEDURE — 97140 MANUAL THERAPY 1/> REGIONS: CPT

## 2018-02-07 PROCEDURE — 97016 VASOPNEUMATIC DEVICE THERAPY: CPT

## 2018-02-07 PROCEDURE — 97110 THERAPEUTIC EXERCISES: CPT

## 2018-02-07 NOTE — PROGRESS NOTES
PT DAILY TREATMENT NOTE     Patient Name: Megan Haq  FHGT:[id number]  : 1965  [x]  Patient  Verified  Payor: SELF PAY / Plan: [facility] SELF PAY / Product Type: Self Pay /    In time:1434  Out time:1525  Total Treatment Time (min): 46  Visit #: 3 of 12    Treatment Area: Right shoulder pain [M25.511]    SUBJECTIVE  Pain Level (0-10 scale): 0/10  Any medication changes, allergies to medications, adverse drug reactions, diagnosis change, or new procedure performed?: [x] No    [] Yes (see summary sheet for update)  Subjective functional status/changes:   [] No changes reported  I feel like I am moving better.     OBJECTIVE    Modality rationale: decrease inflammation and decrease pain to improve the patients ability to improve ADL's   Min Type Additional Details    [] Estim:  []Unatt       []IFC  []Premod                        []Other:  []w/ice   []w/heat  Position:  Location:    [] Estim: []Att    []TENS instruct  []NMES                    []Other:  []w/US   []w/ice   []w/heat  Position:  Location:    []  Traction: [] Cervical       []Lumbar                       [] Prone          []Supine                       []Intermittent   []Continuous Lbs:  [] before manual  [] after manual    []  Ultrasound: []Continuous   [] Pulsed                           []1MHz   []3MHz W/cm2:  Location:    []  Iontophoresis with dexamethasone         Location: [] Take home patch   [] In clinic    []  Ice     []  heat  []  Ice massage  []  Laser   []  Anodyne Position:  Location:    []  Laser with stim  []  Other:  Position:  Location:   10 [x]  Vasopneumatic Device right sh Pressure:       [] lo [x] med [] hi   Temperature: [x] lo [] med [] hi   [x] Skin assessment post-treatment:  [x]intact []redness- no adverse reaction    []redness  adverse reaction:      min []Eval                  []Re-Eval       29 min Therapeutic Exercise:  [x] See flow sheet :reviewed sh isometrics   Rationale: increase ROM, increase strength and improve coordination to improve the patients ability to improve ADL's      12 min Manual Therapy:   MFR to right shoulder, PROM, manual stretching, scap mobes, DTM infraspinatus in prone   Rationale: decrease pain, increase ROM, increase tissue extensibility and decrease trigger points to improve ADL's     min Gait Training:  ___ feet with ___ device on level surfaces with ___ level of assist   Rationale: With   [] TE   [] TA   [] neuro   [] other: Patient Education: [x] Review HEP    [] Progressed/Changed HEP based on:   [] positioning   [] body mechanics   [] transfers   [] heat/ice application    [] other:      Other Objective/Functional Measures: see goal review     Pain Level (0-10 scale) post treatment: 0/10    ASSESSMENT/Changes in Function: Pt demonstrating improved AROM with pain improved. Patient will continue to benefit from skilled PT services to modify and progress therapeutic interventions, address ROM deficits, address strength deficits, analyze and address soft tissue restrictions, analyze and cue movement patterns, analyze and modify body mechanics/ergonomics and instruct in home and community integration to attain remaining goals. [x]  See Plan of Care  []  See progress note/recertification  []  See Discharge Summary         Progress towards goals / Updated goals:  Short Term Goals: To be accomplished in 2 weeks:  1. Patient will be independent and compliant with HEP to achieve other goals. Status at eval: issued and reviewed HEP  Current: Compliant  2. Increase right shoulder PROM by 20 degrees for ABD and IR to restore normal joint mobility for ADL's. Status at kim: ABD: 150, IR: 60  Current:   Long Term Goals: To be accomplished in 4 weeks:  1. Improve FOTO score >/= 67/100 to indicate decreased pain with ADL's  Status at eval: 54/100  Current: retest every 5 visits  2. Increase right shoulder AROM flex >/= 170 to reach overhead with decreased difficulty.   Status at eval: 155  Current:  3. Increase right shoulder AROM ER >/= 60 to normalize function. Status at eval: 40 (@0)  Current:   4. Increase right shoulder AROM IR >/= T9 level to reach behind back with decreased difficulty.   Status at eval: L2 level  Current:     PLAN  []  Upgrade activities as tolerated     []  Continue plan of care  []  Update interventions per flow sheet       []  Discharge due to:_  []  Other:_      Bobby Bear, PTA 2/7/2018  2:37 PM    Future Appointments  Date Time Provider Eduardo Ludwig   2/8/2018 11:00 AM Karli Carcamoon, PTA [facility]   2/9/2018 1:30 PM Everlyn Javeding, PTA [facility]   2/12/2018 11:30 AM Giacomo Shed, PT [facility]   2/15/2018 9:45 AM Evalene Lona, PTA [facility]   2/16/2018 11:00 AM Evalene Lona, PTA [facility]   2/19/2018 11:30 AM Giacomo Shed, PT [facility]   2/20/2018 9:30 AM Giacomo Shed, PT [facility]   2/23/2018 11:30 AM Everlyn Ducking, PTA [facility]   2/26/2018 1:30 PM Evalene Lona, PTA [facility]   2/28/2018 9:00 AM Giacomo Shed, PT [facility]   3/1/2018 9:45 AM Evalene Lona, PTA [facility]

## 2018-02-08 ENCOUNTER — HOSPITAL ENCOUNTER (OUTPATIENT)
Dept: PHYSICAL THERAPY | Age: 53
Discharge: HOME | End: 2018-02-08
Payer: SELF-PAY

## 2018-02-08 PROCEDURE — 97016 VASOPNEUMATIC DEVICE THERAPY: CPT

## 2018-02-08 PROCEDURE — 97140 MANUAL THERAPY 1/> REGIONS: CPT

## 2018-02-08 PROCEDURE — 97110 THERAPEUTIC EXERCISES: CPT

## 2018-02-08 NOTE — PROGRESS NOTES
PT DAILY TREATMENT NOTE     Patient Name: Taryn Evans  XKRE:[id number]  : 1965  [x]  Patient  Verified  Payor: SELF PAY / Plan: [facility] SELF PAY / Product Type: Self Pay /    In time:1100  Out time:1145  Total Treatment Time (min): 45  Visit #: 4 of 12    Treatment Area: Right shoulder pain [M25.511]    SUBJECTIVE  Pain Level (0-10 scale): 6/10  Any medication changes, allergies to medications, adverse drug reactions, diagnosis change, or new procedure performed?: [x] No    [] Yes (see summary sheet for update)  Subjective functional status/changes:   [] No changes reported  Pt reports that her pain increased significantly once she got home and she is still in a lot of pain this morning    OBJECTIVE    Modality rationale: decrease inflammation and decrease pain to improve the patients ability to complete ADLs   Min Type Additional Details    [] Estim:  []Unatt       []IFC  []Premod                        []Other:  []w/ice   []w/heat  Position:  Location:    [] Estim: []Att    []TENS instruct  []NMES                    []Other:  []w/US   []w/ice   []w/heat  Position:  Location:    []  Traction: [] Cervical       []Lumbar                       [] Prone          []Supine                       []Intermittent   []Continuous Lbs:  [] before manual  [] after manual    []  Ultrasound: []Continuous   [] Pulsed                           []1MHz   []3MHz W/cm2:  Location:    []  Iontophoresis with dexamethasone         Location: [] Take home patch   [] In clinic   10 []  Ice     [x]  heat  []  Ice massage  []  Laser   []  Anodyne Position:seated  Location:neck    []  Laser with stim  []  Other:  Position:  Location:   10 [x]  Vasopneumatic Device Pressure:       [] lo [x] med [] hi   Temperature: [] lo [x] med [] hi   [x] Skin assessment post-treatment:  [x]intact [x]redness- no adverse reaction        25 min Therapeutic Exercise:  [x] See flow sheet :facilitate scapular stability   Rationale: increase ROM, improve coordination and increase proprioception to improve the patients ability to complete ADLs      10 min Manual Therapy:  SOR, scap mobes, STM to scalenes SCM, pec major, PROM of right shoulder, gentle myofascial arm pull   Rationale: decrease pain, increase ROM, increase tissue extensibility, decrease edema  and decrease trigger points to complete ADLs          With   [x] TE   [] TA   [] neuro   [] other: Patient Education: [x] Review HEP    [] Progressed/Changed HEP based on:   [] positioning   [] body mechanics   [] transfers   [x] heat/ice application    [] other:      Other Objective/Functional Measures:      Pain Level (0-10 scale) post treatment: 4/10    ASSESSMENT/Changes in Function: Pt demonstrates significant fascial restrictions through upper trapezius, scalenes, SCM and pec major as well as immobility of right scapula contributing to ongoing  Pain. Reviewed pain management    Patient will continue to benefit from skilled PT services to address functional mobility deficits, address ROM deficits, address strength deficits, analyze and address soft tissue restrictions, analyze and cue movement patterns, analyze and modify body mechanics/ergonomics and assess and modify postural abnormalities to attain remaining goals. []  See Plan of Care  []  See progress note/recertification  []  See Discharge Summary         Progress towards goals / Updated goals:  Short Term Goals: To be accomplished in 2 weeks:  1. Patient will be independent and compliant with HEP to achieve other goals. Status at eval: issued and reviewed HEP  Current: Compliant  2. Increase right shoulder PROM by 20 degrees for ABD and IR to restore normal joint mobility for ADL's. Status at kim: ABD: 150, IR: 60  Current:   Long Term Goals: To be accomplished in 4 weeks:  1. Improve FOTO score >/= 67/100 to indicate decreased pain with ADL's  Status at eval: 54/100  Current: retest every 5 visits  2.  Increase right shoulder AROM flex >/= 170 to reach overhead with decreased difficulty. Status at eval: 155  Current:  3. Increase right shoulder AROM ER >/= 60 to normalize function. Status at eval: 40 (@0)  Current:   4. Increase right shoulder AROM IR >/= T9 level to reach behind back with decreased difficulty.   Status at eval: L2 level  Current:     PLAN  []  Upgrade activities as tolerated     []  Continue plan of care  []  Update interventions per flow sheet       []  Discharge due to:_  []  Other:_      Art Rufino, PTA 2/8/2018  6:57 AM    Future Appointments  Date Time Provider Eduardo Ludwig   2/8/2018 11:00 AM Art Rufino, PTA [facility]   2/9/2018 1:30 PM Glen Morel, PTA [facility]   2/12/2018 11:30 AM Amaryljoshuas Maria Eugenia, PT [facility]   2/15/2018 9:45 AM Art Rufino, PTA [facility]   2/16/2018 11:00 AM Art Rufino, PTA [facility]   2/19/2018 11:30 AM Amaryllis Maria Eugenia, PT [facility]   2/20/2018 9:30 AM Amaryllis Maria Eugenia, PT [facility]   2/23/2018 11:30 AM Glen Morel PTA [facility]   2/26/2018 1:30 PM Art Rufino, PTA [facility]   2/28/2018 9:00 AM Amaryllis Maria Eugenia, PT [facility]   3/1/2018 9:45 AM Art Rufino, PTA [facility]

## 2018-02-09 ENCOUNTER — HOSPITAL ENCOUNTER (OUTPATIENT)
Dept: PHYSICAL THERAPY | Age: 53
Discharge: HOME | End: 2018-02-09
Payer: SELF-PAY

## 2018-02-09 PROCEDURE — 97110 THERAPEUTIC EXERCISES: CPT

## 2018-02-09 PROCEDURE — 97140 MANUAL THERAPY 1/> REGIONS: CPT

## 2018-02-09 PROCEDURE — 97014 ELECTRIC STIMULATION THERAPY: CPT

## 2018-02-09 NOTE — PROGRESS NOTES
PT DAILY TREATMENT NOTE     Patient Name: Smooth Parks  IKDK:[id number]  : 1965  [x]  Patient  Verified  Payor: SELF PAY / Plan: [facility] SELF PAY / Product Type: Self Pay /    In time:1330  Out time:1424  Total Treatment Time (min): 47  Visit #: 5 of 12    Treatment Area: Right shoulder pain [M25.511]    SUBJECTIVE  Pain Level (0-10 scale): 6-7/10  Any medication changes, allergies to medications, adverse drug reactions, diagnosis change, or new procedure performed?: [x] No    [] Yes (see summary sheet for update)  Subjective functional status/changes:   [] No changes reported  I have really been hurting    OBJECTIVE    Modality rationale: decrease inflammation and decrease pain to improve the patients ability to improve ADL's   Min Type Additional Details   15 [x] Estim:  [x]Unatt       []IFC  []Premod                        []Other:  [x]w/ice   []w/heat  Position:seated  Location: right sh    [] Estim: []Att    []TENS instruct  []NMES                    []Other:  []w/US   []w/ice   []w/heat  Position:  Location:    []  Traction: [] Cervical       []Lumbar                       [] Prone          []Supine                       []Intermittent   []Continuous Lbs:  [] before manual  [] after manual    []  Ultrasound: []Continuous   [] Pulsed                           []1MHz   []3MHz W/cm2:  Location:    []  Iontophoresis with dexamethasone         Location: [] Take home patch   [] In clinic    []  Ice     []  heat  []  Ice massage  []  Laser   []  Anodyne Position:  Location:    []  Laser with stim  []  Other:  Position:  Location:    []  Vasopneumatic Device Pressure:       [] lo [] med [] hi   Temperature: [] lo [] med [] hi   [x] Skin assessment post-treatment:  [x]intact []redness- no adverse reaction    []redness  adverse reaction:      min []Eval                  []Re-Eval       29 min Therapeutic Exercise:  [x] See flow sheet :   Rationale: increase ROM, increase strength and improve coordination to improve the patients ability to improve ADL's      10 min Manual Therapy:  DTM/TPM right UT/lev scap, rib mobs with deep breathing, gentle myofascial arm pull to modulate pain   Rationale: decrease pain, increase ROM, increase tissue extensibility and decrease trigger points to improve ADL's     min Gait Training:  ___ feet with ___ device on level surfaces with ___ level of assist   Rationale: With   [] TE   [] TA   [] neuro   [] other: Patient Education: [x] Review HEP    [] Progressed/Changed HEP based on:   [] positioning   [] body mechanics   [] transfers   [x] heat/ice application    [] other:      Other Objective/Functional Measures: FOTO: 48/100     Pain Level (0-10 scale) post treatment: 6/10    ASSESSMENT/Changes in Function: Pt c/o increased pain with no improvements reported with e-stim today. Patient will continue to benefit from skilled PT services to modify and progress therapeutic interventions, address ROM deficits, address strength deficits, analyze and address soft tissue restrictions, analyze and cue movement patterns, analyze and modify body mechanics/ergonomics and instruct in home and community integration to attain remaining goals. [x]  See Plan of Care  []  See progress note/recertification  []  See Discharge Summary         Progress towards goals / Updated goals:  Short Term Goals: To be accomplished in 2 weeks:  1. Patient will be independent and compliant with HEP to achieve other goals. Status at eval: issued and reviewed HEP  Current: Compliant with sh isometrics reviewed   2. Increase right shoulder PROM by 20 degrees for ABD and IR to restore normal joint mobility for ADL's. Status at kim: ABD: 150, IR: 60  Current: no change  Long Term Goals: To be accomplished in 4 weeks:  1. Improve FOTO score >/= 67/100 to indicate decreased pain with ADL's  Status at eval: 54/100  Current: retest every 5 visits  2.  Increase right shoulder AROM flex >/= 170 to reach overhead with decreased difficulty. Status at eval: 155  Current:no change with pain at end range  3. Increase right shoulder AROM ER >/= 60 to normalize function. Status at eval: 40 (@0)  Current: no change  4. Increase right shoulder AROM IR >/= T9 level to reach behind back with decreased difficulty.   Status at eval: L2 level  Current: no change    PLAN  []  Upgrade activities as tolerated     [x]  Continue plan of care  []  Update interventions per flow sheet       []  Discharge due to:_  []  Other:_      Santo Scott PTA 2/9/2018  1:32 PM    Future Appointments  Date Time Provider Eduardo Ludwig   2/12/2018 11:30 AM Abdiel Doneno, PT [facility]   2/15/2018 9:45 AM Lue Other, PTA [facility]   2/16/2018 11:00 AM Lue Other, PTA [facility]   2/19/2018 11:30 AM [place] Doom, PT [facility]   2/20/2018 9:30 AM [place] Doom, PT [facility]   2/23/2018 11:30 AM Santo Scott, PTA [facility]   2/26/2018 1:30 PM Lue Other, PTA [facility]   2/28/2018 9:00 AM Abdiel Doom, PT [facility]   3/1/2018 9:45 AM Lue Other, PTA [facility]

## 2018-02-12 ENCOUNTER — HOSPITAL ENCOUNTER (OUTPATIENT)
Dept: PHYSICAL THERAPY | Age: 53
Discharge: HOME | End: 2018-02-12
Payer: SELF-PAY

## 2018-02-12 PROCEDURE — 97140 MANUAL THERAPY 1/> REGIONS: CPT

## 2018-02-12 PROCEDURE — 97110 THERAPEUTIC EXERCISES: CPT

## 2018-02-15 ENCOUNTER — HOSPITAL ENCOUNTER (OUTPATIENT)
Dept: PHYSICAL THERAPY | Age: 53
Discharge: HOME | End: 2018-02-15
Payer: SELF-PAY

## 2018-02-15 PROCEDURE — 97140 MANUAL THERAPY 1/> REGIONS: CPT

## 2018-02-15 PROCEDURE — 97112 NEUROMUSCULAR REEDUCATION: CPT

## 2018-02-15 NOTE — PROGRESS NOTES
PT DAILY TREATMENT NOTE     Patient Name: Jermaine Ibarra  Date:2/15/2018  : 1965  [x]  Patient  Verified  Payor: SELF PAY / Plan: [facility] SELF PAY / Product Type: Self Pay /    In time:940  Out time:1037  Total Treatment Time (min): 62  Visit #: 7 of 12    Treatment Area: Right shoulder pain [M25.511]    SUBJECTIVE  Pain Level (0-10 scale): 7/10  Any medication changes, allergies to medications, adverse drug reactions, diagnosis change, or new procedure performed?: [x] No    [] Yes (see summary sheet for update)  Subjective functional status/changes:   [] No changes reported  Pt reports that her shoulders(upper traps) are killing her and she didn't have this pain before she started therapy    OBJECTIVE    Modality rationale: decrease pain and increase tissue extensibility to improve the patients ability to right shoulder   Min Type Additional Details    [] Estim:  []Unatt       []IFC  []Premod                        []Other: []w/ice   []w/heat  Position:  Location:    [] Estim: []Att    []TENS instruct  []NMES                    []Other:  []w/US   []w/ice   []w/heat  Position:  Location:    []  Traction: [] Cervical       []Lumbar                       [] Prone          []Supine                       []Intermittent   []Continuous Lbs:  [] before manual  [] after manual    []  Ultrasound: []Continuous   [] Pulsed                           []1MHz   []3MHz W/cm2:  Location:    []  Iontophoresis with dexamethasone         Location: [] Take home patch   [] In clinic   10 [x]  Ice     []  heat  []  Ice massage  []  Laser   []  Anodyne Position:supine  Location:upper traps    []  Laser with stim  []  Other:  Position:  Location:    []  Vasopneumatic Device Pressure:       [] lo [] med [] hi   Temperature: [] lo [] med [] hi   [x] Skin assessment post-treatment:  [x]intact [x]redness- no adverse reaction           27 min Neuromuscular Re-education:  [x]  See flow sheet :facilitate scapular stabilizers Rationale: increase ROM, increase strength, improve coordination and increase proprioception  to improve the patients ability tocomplete ADLs    20 min Manual Therapy:  SOR, DTM to scalenes, upper trap, scapular mobes, PROM right shoulder   Rationale: decrease pain, increase ROM, increase tissue extensibility, decrease edema  and decrease trigger points to complete ADLs          With   [] TE   [] TA   [x] neuro   [] other: Patient Education: [x] Review HEP    [] Progressed/Changed HEP based on:   [x] positioning   [x] body mechanics   [] transfers   [x] heat/ice application    [] other:      Other Objective/Functional Measures:      Pain Level (0-10 scale) post treatment: 6/10    ASSESSMENT/Changes in Function: Pt demonstrates overcompensation of upper trapezius intermittently throughout today's visit. Educated pt on relaxed and upright posture however pt lacks proprioception contributing to ongoing pain in upper traps. Encouraged pt to hold shoulder shrugs and anything that increases pain. Also encouraged to use TENS unit (that was adjusted for pain today) 3 times daily or continuously. Patient will continue to benefit from skilled PT services to address functional mobility deficits, address ROM deficits, address strength deficits, analyze and address soft tissue restrictions, analyze and cue movement patterns, analyze and modify body mechanics/ergonomics and assess and modify postural abnormalities to attain remaining goals. []  See Plan of Care  []  See progress note/recertification  []  See Discharge Summary         Progress towards goals / Updated goals:  Short Term Goals: To be accomplished in 2 weeks:  1. Patient will be independent and compliant with HEP to achieve other goals. Status at eval: issued and reviewed HEP  Current: Compliant with sh isometrics reviewed   2. Increase right shoulder PROM by 20 degrees for ABD and IR to restore normal joint mobility for ADL's.   Status at kim: ABD: 150, IR: 60  Current: no change  Long Term Goals: To be accomplished in 4 weeks:  1. Improve FOTO score >/= 67/100 to indicate decreased pain with ADL's  Status at eval: 54/100  Current: 48/100  2. Increase right shoulder AROM flex >/= 170 to reach overhead with decreased difficulty. Status at eval: 155  Current:no change with pain at end range  3. Increase right shoulder AROM ER >/= 60 to normalize function. Status at eval: 40 (@0)  Current: no change  4. Increase right shoulder AROM IR >/= T9 level to reach behind back with decreased difficulty.   Status at eval: L2 level  Current: no change    PLAN  []  Upgrade activities as tolerated     []  Continue plan of care  []  Update interventions per flow sheet       []  Discharge due to:_  []  Other:_      Coni Bland PTA 2/15/2018  10:06 AM    Future Appointments  Date Time Provider Eduardo Ludwig   2/19/2018 11:30 AM LILIYA Garcia[facility]PT[facility]   2/20/2018 9:30 AM Zbigniew Vela PT [facility]   2/23/2018 11:30 AM ALONSO DashHPTVY [facility]   2/26/2018 1:30 PM ALONSO CroninHPTVJORGE [facility]   2/28/2018 9:00 AM Zbigniew Vela PT [facility]   3/1/2018 9:45 AM ALONSO CroninHPTVJORGE [facility]

## 2018-02-16 ENCOUNTER — APPOINTMENT (OUTPATIENT)
Dept: PHYSICAL THERAPY | Age: 53
End: 2018-02-16
Payer: SELF-PAY

## 2018-02-19 ENCOUNTER — HOSPITAL ENCOUNTER (OUTPATIENT)
Dept: PHYSICAL THERAPY | Age: 53
Discharge: HOME | End: 2018-02-19
Payer: SELF-PAY

## 2018-02-19 PROCEDURE — 97140 MANUAL THERAPY 1/> REGIONS: CPT

## 2018-02-19 PROCEDURE — 97110 THERAPEUTIC EXERCISES: CPT

## 2018-02-19 NOTE — PROGRESS NOTES
PT DAILY TREATMENT NOTE     Patient Name: Ge Mcgowan  Date:2018  : 1965  [x]  Patient  Verified  Payor: SELF PAY / Plan: [facility] SELF PAY / Product Type: Self Pay /    In time:1130  Out time:1218  Total Treatment Time (min): 48  Visit #: 8 of 12    Treatment Area: Right shoulder pain [M25.511]    SUBJECTIVE  Pain Level (0-10 scale): 4-5/10  Any medication changes, allergies to medications, adverse drug reactions, diagnosis change, or new procedure performed?: [x] No    [] Yes (see summary sheet for update)  Subjective functional status/changes:   [] No changes reported  I'm feeling better about the shoulder. My neck feels better than it did last time. I used the TENS unit once over the weekend.     OBJECTIVE    Modality rationale: decrease inflammation and decrease pain to improve the patients ability to increase activity/position tolerance   Min Type Additional Details    [] Estim:  []Unatt       []IFC  []Premod                        []Other:  []w/ice   []w/heat  Position:  Location:    [] Estim: []Att    []TENS instruct  []NMES                    []Other:  []w/US   []w/ice   []w/heat  Position:  Location:    []  Traction: [] Cervical       []Lumbar                       [] Prone          []Supine                       []Intermittent   []Continuous Lbs:  [] before manual  [] after manual    []  Ultrasound: []Continuous   [] Pulsed                           []1MHz   []3MHz W/cm2:  Location:    []  Iontophoresis with dexamethasone         Location: [] Take home patch   [] In clinic   10 [x]  Ice     []  heat  []  Ice massage  []  Laser   []  Anodyne Position: H/L supine  Location:neck    []  Laser with stim  []  Other:  Position:  Location:    []  Vasopneumatic Device Pressure:       [] lo [] med [] hi   Temperature: [] lo [] med [] hi   [] Skin assessment post-treatment:  []intact []redness- no adverse reaction    []redness  adverse reaction:      min []Eval                  []Re-Eval 28 min Therapeutic Exercise:  [x] See flow sheet :   Rationale: increase ROM and increase strength to improve the patients ability to utilize right UE with ADL's     min Therapeutic Activity:  []  See flow sheet :         min Neuromuscular Re-education:  []  See flow sheet :       10 min Manual Therapy:  DTM/TPR to B UT region, SOR in H/L supine   Rationale: decrease pain, increase ROM, increase tissue extensibility and decrease trigger points to normalize function     min Gait Training:  ___ feet with ___ device on level surfaces with ___ level of assist   Rationale: With   [] TE   [] TA   [] neuro   [] other: Patient Education: [x] Review HEP    [] Progressed/Changed HEP based on:   [] positioning   [] body mechanics   [] transfers   [] heat/ice application    [] other:      Other Objective/Functional Measures: none taken today     Pain Level (0-10 scale) post treatment: 1/10    ASSESSMENT/Changes in Function: Neck/UT region pain less than previous visit, but still persists with pt overcompensating/substituting secondary to shoulder pathology. Patient will continue to benefit from skilled PT services to modify and progress therapeutic interventions, address ROM deficits, address strength deficits, analyze and address soft tissue restrictions, analyze and cue movement patterns, analyze and modify body mechanics/ergonomics and assess and modify postural abnormalities to attain remaining goals. []  See Plan of Care  []  See progress note/recertification  []  See Discharge Summary         Progress towards goals / Updated goals:  Short Term Goals: To be accomplished in 2 weeks:  1. Patient will be independent and compliant with HEP to achieve other goals. Status at eval: issued and reviewed HEP  Current: Compliant with sh isometrics reviewed   2. Increase right shoulder PROM by 20 degrees for ABD and IR to restore normal joint mobility for ADL's.   Status at kim: ABD: 150, IR: 60  Current: no change  Long Term Goals: To be accomplished in 4 weeks:  1. Improve FOTO score >/= 67/100 to indicate decreased pain with ADL's  Status at eval: 54/100  Current: 48/100  2. Increase right shoulder AROM flex >/= 170 to reach overhead with decreased difficulty. Status at eval: 155  Current:no change with pain at end range  3. Increase right shoulder AROM ER >/= 60 to normalize function. Status at eval: 40 (@0)  Current: no change  4. Increase right shoulder AROM IR >/= T9 level to reach behind back with decreased difficulty.   Status at eval: L2 level  Current: no change      PLAN  [x]  Upgrade activities as tolerated     [x]  Continue plan of care  []  Update interventions per flow sheet       []  Discharge due to:_  []  Other:_      Jeffery Res, PT 2/19/2018  11:27 AM    Future Appointments  Date Time Provider Eduardo Ludwig   2/19/2018 11:30 AM Jeffery Res, PT [facility]   2/20/2018 9:30 AM Jeffery Res, PT [facility]   2/23/2018 11:30 AM Emerita Whitten PTA [facility]   2/26/2018 1:30 PM Olya Downs PTA [facility]   2/28/2018 9:00 AM Jeffery Res, PT [facility]   3/1/2018 9:45 AM Olya Downs PTA [facility]

## 2018-02-20 ENCOUNTER — HOSPITAL ENCOUNTER (OUTPATIENT)
Dept: PHYSICAL THERAPY | Age: 53
Discharge: HOME | End: 2018-02-20
Payer: SELF-PAY

## 2018-02-20 PROCEDURE — 97112 NEUROMUSCULAR REEDUCATION: CPT

## 2018-02-20 PROCEDURE — 97140 MANUAL THERAPY 1/> REGIONS: CPT

## 2018-02-20 NOTE — PROGRESS NOTES
PT DAILY TREATMENT NOTE     Patient Name: Placido Pollock  Date:2018  : 1965  [x]  Patient  Verified  Payor: SELF PAY / Plan: [facility] SELF PAY / Product Type: Self Pay /    In time:830  Out time:928  Total Treatment Time (min): 58  Visit #: 9 of 12    Treatment Area: Right shoulder pain [M25.511]    SUBJECTIVE  Pain Level (0-10 scale): 4/10  Any medication changes, allergies to medications, adverse drug reactions, diagnosis change, or new procedure performed?: [x] No    [] Yes (see summary sheet for update)  Subjective functional status/changes:   [] No changes reported  Pt reports that her neck is sore but she is doing better.     OBJECTIVE    Modality rationale: decrease inflammation and decrease pain to improve the patients ability to complete ADLs   Min Type Additional Details    [] Estim:  []Unatt       []IFC  []Premod                        []Other:  []w/ice   []w/heat  Position:  Location:    [] Estim: []Att    []TENS instruct  []NMES                    []Other:  []w/US   []w/ice   []w/heat  Position:  Location:    []  Traction: [] Cervical       []Lumbar                       [] Prone          []Supine                       []Intermittent   []Continuous Lbs:  [] before manual  [] after manual    []  Ultrasound: []Continuous   [] Pulsed                           []1MHz   []3MHz W/cm2:  Location:    []  Iontophoresis with dexamethasone         Location: [] Take home patch   [] In clinic    []  Ice     []  heat  []  Ice massage  []  Laser   []  Anodyne Position:  Location:    []  Laser with stim  []  Other:  Position:  Location:   10 [x]  Vasopneumatic Device Pressure:       [] lo [x] med [] hi   Temperature: [] lo [x] med [] hi   [x] Skin assessment post-treatment:  [x]intact [x]redness- no adverse reaction    []redness  adverse reaction:      38 min Neuromuscular Re-education:  [x]  See flow sheet :facilitate shoulder girdle strength   Rationale: increase ROM, increase strength, improve coordination, improve balance and increase proprioception  to improve the patients ability to complete ADLs    10 min Manual Therapy: SOR, MFR to right shoulder, gentle myofascial arm pull, DTM to scalenes and SCM   Rationale: decrease pain, increase ROM, increase tissue extensibility, decrease edema  and decrease trigger points to complete ADLs          With   [] TE   [] TA   [x] neuro   [] other: Patient Education: [x] Review HEP    [] Progressed/Changed HEP based on:   [x] positioning   [x] body mechanics   [] transfers   [] heat/ice application    [] other:      Other Objective/Functional Measures:    Pain Level (0-10 scale) post treatment:3/10    ASSESSMENT/Changes in Function: Pt demonstrates improving shoulder girdle strength with less compensatory use of upper trapezius leading to reduced pain. She demonstrates full AROM for abduction and flexion. Patient will continue to benefit from skilled PT services to address functional mobility deficits, address ROM deficits, address strength deficits, analyze and address soft tissue restrictions, analyze and cue movement patterns, analyze and modify body mechanics/ergonomics and assess and modify postural abnormalities to attain remaining goals. []  See Plan of Care  []  See progress note/recertification  []  See Discharge Summary         Progress towards goals / Updated goals:  Short Term Goals: To be accomplished in 2 weeks:  1. Patient will be independent and compliant with HEP to achieve other goals. Status at eval: issued and reviewed HEP  Current: Compliant with  isometrics reviewed   2. Increase right shoulder PROM by 20 degrees for ABD and IR to restore normal joint mobility for ADL's. Status at kim: ABD: 150, IR: 60  Current: MET: 180 degrees of ABD and FLEX AROM  Long Term Goals: To be accomplished in 4 weeks:  1. Improve FOTO score >/= 67/100 to indicate decreased pain with ADL's  Status at eval: 54/100  Current: 48/100  2.  Increase right shoulder AROM flex >/= 170 to reach overhead with decreased difficulty. Status at eval: 155  Current: degrees  3. Increase right shoulder AROM ER >/= 60 to normalize function. Status at eval: 40 (@0)  Current: no change  4. Increase right shoulder AROM IR >/= T9 level to reach behind back with decreased difficulty.   Status at eval: L2 level  Current: no change    PLAN  [x]  Upgrade activities as tolerated     [x]  Continue plan of care  []  Update interventions per flow sheet       []  Discharge due to:_  []  Other:_      Kathleen Vasquez PTA 2/20/2018  7:47 AM    Future Appointments  Date Time Provider Eduardo Ludwig   2/20/2018 8:30 AM ALONSO Nicholson [facility]   2/23/2018 11:30 [facility]   2/26/2018 1:30 PM ALONSO NicholsonHPTMAXIME THE [facility]   2/28/2018 9:00 AM Mckinley Laws, PT [facility]   3/1/2018 9:45 AM ALONSO NicholsonHPTMAXIME THE [facility]

## 2018-02-23 ENCOUNTER — HOSPITAL ENCOUNTER (OUTPATIENT)
Dept: PHYSICAL THERAPY | Age: 53
Discharge: HOME | End: 2018-02-23
Payer: SELF-PAY

## 2018-02-23 PROCEDURE — 97140 MANUAL THERAPY 1/> REGIONS: CPT

## 2018-02-23 PROCEDURE — 97530 THERAPEUTIC ACTIVITIES: CPT

## 2018-02-23 PROCEDURE — 97110 THERAPEUTIC EXERCISES: CPT

## 2018-02-23 NOTE — PROGRESS NOTES
PT DAILY TREATMENT NOTE     Patient Name: Jared Donohue  WUGF:  : 1965  [x]  Patient  Verified  Payor: SELF PAY / Plan: [facility] SELF PAY / Product Type: Self Pay /    In time:1132  Out time:1230  Total Treatment Time (min): 62  Visit #: 10 of 12    Treatment Area: Right shoulder pain [M25.511]    SUBJECTIVE  Pain Level (0-10 scale): 5/10 neck pain  Any medication changes, allergies to medications, adverse drug reactions, diagnosis change, or new procedure performed?: [x] No    [] Yes (see summary sheet for update)  Subjective functional status/changes:   [] No changes reported  I have some neck pain that Myah Pang has been working on. I see the MD in [place] on Monday. I am about 70% improved. This has really helped.     OBJECTIVE    Modality rationale:    Min Type Additional Details    [] Estim:  []Unatt       []IFC  []Premod                        []Other:  []w/ice   []w/heat  Position:  Location:    [] Estim: []Att    []TENS instruct  []NMES                    []Other:  []w/US   []w/ice   []w/heat  Position:  Location:    []  Traction: [] Cervical       []Lumbar                       [] Prone          []Supine                       []Intermittent   []Continuous Lbs:  [] before manual  [] after manual    []  Ultrasound: []Continuous   [] Pulsed                           []1MHz   []3MHz W/cm2:  Location:    []  Iontophoresis with dexamethasone         Location: [] Take home patch   [] In clinic    []  Ice     []  heat  []  Ice massage  []  Laser   []  Anodyne Position:  Location:    []  Laser with stim  []  Other:  Position:  Location:    []  Vasopneumatic Device Pressure:       [] lo [] med [] hi   Temperature: [] lo [] med [] hi   [] Skin assessment post-treatment:  []intact []redness- no adverse reaction    []redness  adverse reaction:      min []Eval                  []Re-Eval       38 min Therapeutic Exercise:  [] See flow sheet :   Rationale: increase ROM, increase strength and improve coordination to improve the patients ability to improve ADL's    10 min Therapeutic Activity:  []  See flow sheet :   Rationale: increase ROM, increase strength and improve coordination  to improve the patients ability to improve ADL's         14 min Manual Therapy:SOR, MFR to right shoulder, gentle myofascial arm pull, DTM to scalenes and SCM     Rationale: decrease pain, increase ROM, increase tissue extensibility and decrease trigger points to improve ADL's         With   [] TE   [] TA   [] neuro   [] other: Patient Education: [x] Review HEP    [] Progressed/Changed HEP based on:   [] positioning   [] body mechanics   [] transfers   [] heat/ice application    [] other:      Other Objective/Functional Measures:      Pain Level (0-10 scale) post treatment: 6/10    ASSESSMENT/Changes in Function: Trigger points persist in cervico-thoracic  On right sec to compensatory movements with sh elevation. Patient will continue to benefit from skilled PT services to modify and progress therapeutic interventions, address ROM deficits, address strength deficits, analyze and address soft tissue restrictions, analyze and cue movement patterns, analyze and modify body mechanics/ergonomics and instruct in home and community integration to attain remaining goals. [x]  See Plan of Care  []  See progress note/recertification  []  See Discharge Summary         Progress towards goals / Updated goals:  Short Term Goals: To be accomplished in 2 weeks:  1. Patient will be independent and compliant with HEP to achieve other goals. Status at eval: issued and reviewed HEP  Current: Compliant with sh isometrics reviewed   2. Increase right shoulder PROM by 20 degrees for ABD and IR to restore normal joint mobility for ADL's. Status at kim: ABD: 150, IR: 60  Current: MET: 180 degrees of ABD and FLEX AROM  Long Term Goals: To be accomplished in 4 weeks:  1.  Improve FOTO score >/= 67/100 to indicate decreased pain with ADL's  Status at eval: 54/100  Current: 48/100  2. Increase right shoulder AROM flex >/= 170 to reach overhead with decreased difficulty. Status at eval: 155  Current: degrees  3. Increase right shoulder AROM ER >/= 60 to normalize function. Status at eval: 40 (@0)  Current: no change  4. Increase right shoulder AROM IR >/= T9 level to reach behind back with decreased difficulty. Status at eval: L2 level  Current: no change    PLAN  []  Upgrade activities as tolerated     [x]  Continue plan of care  []  Update interventions per flow sheet       []  Discharge due to:_  [x]  Other:_MD follow up on Monday.       Melissa Nava PTA 2/23/2018  11:44 AM    Future Appointments  Date Time Provider Eduardo Ludwig   2/28/2018 9:00 AM Dave [facility]ENMANU[facility]   3/1/2018 9:45 AM [facility]

## 2018-02-26 ENCOUNTER — APPOINTMENT (OUTPATIENT)
Dept: PHYSICAL THERAPY | Age: 53
End: 2018-02-26
Payer: SELF-PAY

## 2018-02-28 ENCOUNTER — HOSPITAL ENCOUNTER (OUTPATIENT)
Dept: PHYSICAL THERAPY | Age: 53
Discharge: HOME | End: 2018-02-28
Payer: SELF-PAY

## 2018-02-28 PROCEDURE — 97140 MANUAL THERAPY 1/> REGIONS: CPT

## 2018-02-28 PROCEDURE — 97016 VASOPNEUMATIC DEVICE THERAPY: CPT

## 2018-02-28 PROCEDURE — 97112 NEUROMUSCULAR REEDUCATION: CPT

## 2018-02-28 NOTE — PROGRESS NOTES
PT DAILY TREATMENT NOTE     Patient Name: Janie Qureshi  GXWD:  : 1965  [x]  Patient  Verified  Payor: SELF PAY / Plan: [facility] SELF PAY / Product Type: Self Pay /    In time:900  Out time:950  Total Treatment Time (min): 50  Visit #:  of 12    Treatment Area: Right shoulder pain [M25.511]    SUBJECTIVE  Pain Level (0-10 scale): 5/10  Any medication changes, allergies to medications, adverse drug reactions, diagnosis change, or new procedure performed?: [x] No    [] Yes (see summary sheet for update)  Subjective functional status/changes:   [] No changes reported  Pt reports that her neck is really painful but that her shoulder is much better. She states that she is 70% improved with the ability to put clothing away, put her bra on and wash her hair. She is still limited with lifting anything heavier than 5 lbs, limiting care for her dog and granddaughter. She says that she still can't buckle her bra easily but it is better than it was.     OBJECTIVE    Modality rationale: decrease inflammation and decrease pain to improve the patients ability to lift her grandchild   Min Type Additional Details    [] Estim:  []Unatt       []IFC  []Premod                        []Other:  []w/ice   []w/heat  Position:  Location:    [] Estim: []Att    []TENS instruct  []NMES                    []Other:  []w/US   []w/ice   []w/heat  Position:  Location:    []  Traction: [] Cervical       []Lumbar                       [] Prone          []Supine                       []Intermittent   []Continuous Lbs:  [] before manual  [] after manual    []  Ultrasound: []Continuous   [] Pulsed                           []1MHz   []3MHz W/cm2:  Location:    []  Iontophoresis with dexamethasone         Location: [] Take home patch   [] In clinic    []  Ice     []  heat  []  Ice massage  []  Laser   []  Anodyne Position:  Location:    []  Laser with stim  []  Other:  Position:  Location:   10 [x]  Vasopneumatic Device Pressure: [] lo [x] med [] hi   Temperature: [] lo [] med [] hi   [x] Skin assessment post-treatment:  [x]intact [x]redness- no adverse reaction     30 min Neuromuscular Re-education:  [x]  See flow sheet :facilitate scapular stabilizers and core strength   Rationale: increase ROM, increase strength, improve coordination and increase proprioception  to improve the patients ability to lift her grandchild    10 min Manual Therapy:  PROM, myofascial arm pull, DTM to pec major, bicep and anterior deltoid   Rationale: decrease pain, increase ROM, increase tissue extensibility, decrease edema  and decrease trigger points to improve her ability to lift her grandchild          With   [] TE   [] TA   [x] neuro   [] other: Patient Education: [x] Review HEP    [] Progressed/Changed HEP based on:   [x] positioning   [x] body mechanics   [] transfers   [x] heat/ice application    [] other:      Other Objective/Functional Measures:     Pain Level (0-10 scale) post treatment: 2-3/10    ASSESSMENT/Changes in Function:Pt' demonstrates improved AROM in all planes of motion and improved strength however will benefit from continued strengthening to improve function as well as to address core deficits. Pt will benefit form oov training to address both in future visits. Patient will continue to benefit from skilled PT services to address functional mobility deficits, address ROM deficits, address strength deficits, analyze and address soft tissue restrictions, analyze and cue movement patterns, analyze and modify body mechanics/ergonomics and assess and modify postural abnormalities to attain remaining goals. []  See Plan of Care  []  See progress note/recertification  []  See Discharge Summary         Progress towards goals / Updated goals:  Short Term Goals: To be accomplished in 2 weeks:  1. Patient will be independent and compliant with HEP to achieve other goals.   Status at eval: issued and reviewed HEP  Current: Compliant with HEP  2. Increase right shoulder PROM by 20 degrees for ABD and IR to restore normal joint mobility for ADL's. Status at kim: ABD: 150, IR: 60  Current: MET: 180 degrees of ABD and FLEX AROM  Long Term Goals: To be accomplished in 4 weeks:  1. Improve FOTO score >/= 67/100 to indicate decreased pain with ADL's  Status at eval: 54/100  Current: 54/100 at last visit however reports 70% improvement  2. Increase right shoulder AROM flex >/= 170 to reach overhead with decreased difficulty. Status at eval: 155  Current: degrees  3. Increase right shoulder AROM ER >/= 60 to normalize function. Status at eval: 40 (@0)  Current: MET: WNL  4. Increase right shoulder AROM IR >/= T9 level to reach behind back with decreased difficulty.   Status at eval: L2 level  Current: Improved        PLAN  [x]  Upgrade activities as tolerated     [x]  Continue plan of care  []  Update interventions per flow sheet       []  Discharge due to:_  []  Other:_      Brigitte Maher PTA 2/28/2018  9:05 AM    Future Appointments  Date Time Provider Eduardo Ludwig   3/1/2018 9:45 AM Brigitte Maher PTA [facility]

## 2018-02-28 NOTE — PROGRESS NOTES
[facility] at [address] [place] [address]  Phone: [phone]   Fax: [phone]    Progress Note  Patient name: Yvonne Bond Start of Care: 18   Referral source: Darius Mi MD : 1965   Medical/Treatment Diagnosis: Right shoulder pain [M25.511] Onset Date:summer 2017     Prior Hospitalization: see medical history Provider#: [id number]   Medications: Verified on Patient Summary List    Comorbidities: heart disease, fibromyalgia  Prior Level of Function:    Visits from Start of Care: 11    Missed Visits: 0    Established Goals:          Excellent Good         Limited           None  [] Increased ROM   []  [x]  []  []  [x] Increased Strength  []  []  [x]  []  [] Increased Mobility  []  []  []  []   [x] Decreased Pain   []  [x]  []  []  [] Decreased Swelling  []  []  []  []    Key Functional Changes: Pt demonstrates improved AROM in all planes of motion and improved strength however will benefit from continued strengthening to improve function as well as to address core deficits. Pt will benefit form oov training to address both in future visits. Pt's condition complicated by neck pain secondary to UT engagement. Treatment has included ROM/stretching, manual techniques, shoulder strengthening, and use of heat/ice application. Short Term Goals: To be accomplished in 2 weeks:  1. Patient will be independent and compliant with HEP to achieve other goals. Status at eval: issued and reviewed HEP  Current: Compliant with HEP  2. Increase right shoulder PROM by 20 degrees for ABD and IR to restore normal joint mobility for ADL's. Status at kim: ABD: 150, IR: 60  Current: MET: 180 degrees of ABD and FLEX AROM  Long Term Goals: To be accomplished in 4 weeks:  1. Improve FOTO score >/= 67/100 to indicate decreased pain with ADL's  Status at eval: 54/100  Current: 54/100 at last visit however reports 70% improvement  2.  Increase right shoulder AROM flex >/= 170 to reach overhead with decreased difficulty. Status at eval: 155  Current: degrees  3. Increase right shoulder AROM ER >/= 60 to normalize function. Status at eval: 40 (@0)  Current: MET: WNL  4. Increase right shoulder AROM IR >/= T9 level to reach behind back with decreased difficulty. Status at eval: L2 level  Current: Improved   Updated Goals: to be achieved in 4 weeks:  Continue with unmet goal above. New Goal: Increase right shoulder strength >/= 4+/5 to normalize function. Current Status: NT    ASSESSMENT/RECOMMENDATIONS:  [x]Continue therapy per initial plan/protocol at a frequency of  2 x per week for 4 weeks  []Continue therapy with the following recommended changes:_____________________      _____________________________________________________________________  []Discontinue therapy progressing towards or have reached established goals  []Discontinue therapy due to lack of appreciable progress towards goals  []Discontinue therapy due to lack of attendance or compliance  []Await Physician's recommendations/decisions regarding therapy  []Other:________________________________________________________________    Thank you for this referral.   Tita Siddiqui, PT 2/28/2018 10:54 AM  NOTE TO PHYSICIAN:  PLEASE COMPLETE THE ORDERS BELOW AND   FAX TO [facility]: [phone]  If you are unable to process this request in 24 hours please contact our office:   [phone]  []  I have read the above report and request that my patient continue as recommended. []  I have read the above report and request that my patient continue therapy with the following changes/special instructions:________________________________________  []I have read the above report and request that my patient be discharged from therapy.     Physicians signature: ______________________________Date: ______Time:______

## 2018-03-01 ENCOUNTER — APPOINTMENT (OUTPATIENT)
Dept: PHYSICAL THERAPY | Age: 53
End: 2018-03-01
Payer: SELF-PAY

## 2018-03-13 ENCOUNTER — HOSPITAL ENCOUNTER (OUTPATIENT)
Dept: PHYSICAL THERAPY | Age: 53
Discharge: HOME | End: 2018-03-13
Payer: SELF-PAY

## 2018-03-13 PROCEDURE — 97016 VASOPNEUMATIC DEVICE THERAPY: CPT

## 2018-03-13 PROCEDURE — 97112 NEUROMUSCULAR REEDUCATION: CPT

## 2018-03-13 PROCEDURE — 97140 MANUAL THERAPY 1/> REGIONS: CPT

## 2018-03-13 NOTE — PROGRESS NOTES
PT DAILY TREATMENT NOTE     Patient Name: Kika Sepulveda  XYMU:  : 1965  [x]  Patient  Verified  Payor: /    In time:106  Out time:203  Total Treatment Time (min): 62  Visit #:  19    Treatment Area: Right shoulder pain [M25.511]    SUBJECTIVE  Pain Level (0-10 scale): 1-2/10  Any medication changes, allergies to medications, adverse drug reactions, diagnosis change, or new procedure performed?: [x] No    [] Yes (see summary sheet for update)  Subjective functional status/changes:   [] No changes reported  Pt reports that she has not been doing her exercises over the last week    OBJECTIVE    Modality rationale: decrease inflammation and decrease pain to improve the patients ability to complete ADLs   Min Type Additional Details    [] Estim:  []Unatt       []IFC  []Premod                        []Other:  []w/ice   []w/heat  Position:  Location:    [] Estim: []Att    []TENS instruct  []NMES                    []Other:  []w/US   []w/ice   []w/heat  Position:  Location:    []  Traction: [] Cervical       []Lumbar                       [] Prone          []Supine                       []Intermittent   []Continuous Lbs:  [] before manual  [] after manual    []  Ultrasound: []Continuous   [] Pulsed                           []1MHz   []3MHz W/cm2:  Location:    []  Iontophoresis with dexamethasone         Location: [] Take home patch   [] In clinic    []  Ice     []  heat  []  Ice massage  []  Laser   []  Anodyne Position:  Location:    []  Laser with stim  []  Other:  Position:  Location:   10 [x]  Vasopneumatic Device Pressure:       [] lo [x] med [] hi   Temperature: [] lo [x] med [] hi   [x] Skin assessment post-treatment:  [x]intact [x]redness- no adverse reaction    37 min Neuromuscular Re-education:  []  See flow sheet :   Rationale: increase ROM, increase strength, improve coordination and increase proprioception  to improve the patients ability to conplete ADLs    10 min Manual Therapy:  DTM to right pec major, bicep, upper trapezius insertion on clavicle   Rationale: decrease pain, increase ROM, increase tissue extensibility, decrease edema  and decrease trigger points to complete ADls          With   [] TE   [] TA   [x] neuro   [] other: Patient Education: [x] Review HEP    [] Progressed/Changed HEP based on:   [x] positioning   [x] body mechanics   [] transfers   [] heat/ice application    [] other:      Other Objective/Functional Measures:      Pain Level (0-10 scale) post treatment: 1-2/10    ASSESSMENT/Changes in Function: Pt demonstrates improved PROM as well as AROM with some pain noted during eccentric abduction and IR. Pt continues to progress with strength tolerating increased challenges however is limited by core strength. Discussed postural alignment. Patient will continue to benefit from skilled PT services to address functional mobility deficits, address ROM deficits, address strength deficits, analyze and address soft tissue restrictions, analyze and cue movement patterns, analyze and modify body mechanics/ergonomics, assess and modify postural abnormalities and instruct in home and community integration to attain remaining goals. []  See Plan of Care  []  See progress note/recertification  []  See Discharge Summary         Progress towards goals / Updated goals:  Short Term Goals: To be accomplished in 2 weeks:  1. Patient will be independent and compliant with HEP to achieve other goals. Status at eval: issued and reviewed HEP  Last PN: Compliant with HEP  Current: Non compliant last two weeks    Long Term Goals: To be accomplished in 4 weeks:  1. Improve FOTO score >/= 67/100 to indicate decreased pain with ADL's  Status at eval: 54/100  Last PN: 54/100 at last visit however reports 70% improvement    4. Increase right shoulder AROM IR >/= T9 level to reach behind back with decreased difficulty.   Status at eval: L2 level  Last PNt: Improved  Current:  No change since last PN      New Goal: Increase right shoulder strength >/= 4+/5 to normalize function.   Current Status: NT  Current: No change since last PN       PLAN  [x]  Upgrade activities as tolerated     [x]  Continue plan of care  []  Update interventions per flow sheet       []  Discharge due to:_  []  Other:_      Karli Lucero PTA 3/13/2018  12:44 PM    Future Appointments  Date Time Provider Eduardo Ludwig   3/13/2018 1:00 PM [place] [facility] [place] [facility]   3/16/2018 2:30 PM ALONSO Ventura THE [facility]   3/19/2018 12:45 PM ALONSO Mack THE [facility]   3/22/2018 10:00 AM ALONSO Mack THE [facility]   3/26/2018 11:45 AM [facility] [place] [facility]   3/29/2018 3:00 PM ALONSO MackHPTMAXIME THE [facility]

## 2018-03-16 ENCOUNTER — HOSPITAL ENCOUNTER (OUTPATIENT)
Dept: PHYSICAL THERAPY | Age: 53
Discharge: HOME | End: 2018-03-16
Payer: SELF-PAY

## 2018-03-16 PROCEDURE — 97140 MANUAL THERAPY 1/> REGIONS: CPT

## 2018-03-16 PROCEDURE — 97016 VASOPNEUMATIC DEVICE THERAPY: CPT

## 2018-03-16 PROCEDURE — 97110 THERAPEUTIC EXERCISES: CPT

## 2018-03-16 NOTE — PROGRESS NOTES
PT DAILY TREATMENT NOTE     Patient Name: Kacey Taylor  Date:3/16/2018  : 1965  [x]  Patient  Verified  Payor: SELF PAY / Plan: [facility] SELF PAY / Product Type: Self Pay /    In time:1430  Out time:1527  Total Treatment Time (min): 62  Visit #: 13 of 19    Treatment Area: Right shoulder pain [M25.511]    SUBJECTIVE  Pain Level (0-10 scale): 3-4/10  Any medication changes, allergies to medications, adverse drug reactions, diagnosis change, or new procedure performed?: [x] No    [] Yes (see summary sheet for update)  Subjective functional status/changes:   [] No changes reported  I was sore after getting back to PT.    OBJECTIVE    Modality rationale: decrease inflammation and decrease pain to improve the patients ability to improve ADL's   Min Type Additional Details    [] Estim:  []Unatt       []IFC  []Premod                        []Other:  []w/ice   []w/heat  Position:  Location:    [] Estim: []Att    []TENS instruct  []NMES                    []Other:  []w/US   []w/ice   []w/heat  Position:  Location:    []  Traction: [] Cervical       []Lumbar                       [] Prone          []Supine                       []Intermittent   []Continuous Lbs:  [] before manual  [] after manual    []  Ultrasound: []Continuous   [] Pulsed                           []1MHz   []3MHz W/cm2:  Location:    []  Iontophoresis with dexamethasone         Location: [] Take home patch   [] In clinic    []  Ice     []  heat  []  Ice massage  []  Laser   []  Anodyne Position:  Location:    []  Laser with stim  []  Other:  Position:  Location:   10 [x]  Vasopneumatic Device right sh Pressure:       [] lo [x] med [] hi   Temperature: [x] lo [] med [] hi   [x] Skin assessment post-treatment:  [x]intact []redness- no adverse reaction    []redness  adverse reaction:      min []Eval                  []Re-Eval       33 min Therapeutic Exercise:  [x] See flow sheet :   Rationale: increase ROM, increase strength and improve coordination to improve the patients ability to improve ADL's      14 min Manual Therapy:  STM right pec minor, UT and infraspinatus through axillary approach   Rationale: decrease pain, increase ROM, increase tissue extensibility and decrease trigger points to improve ADL's              With   [] TE   [] TA   [] neuro   [] other: Patient Education: [x] Review HEP    [] Progressed/Changed HEP based on:   [] positioning   [] body mechanics   [] transfers   [] heat/ice application    [] other:      Other Objective/Functional Measures:      Pain Level (0-10 scale) post treatment: 1-2/10    ASSESSMENT/Changes in Function: Pt presents with right cervico-thoracic pain and mm tightness sec to compensatory movements to elevate right UE. Patient will continue to benefit from skilled PT services to modify and progress therapeutic interventions, address ROM deficits, address strength deficits, analyze and address soft tissue restrictions, analyze and cue movement patterns and instruct in home and community integration to attain remaining goals. []  See Plan of Care  [x]  See progress note/recertification  []  See Discharge Summary         Progress towards goals / Updated goals:  Short Term Goals: To be accomplished in 2 weeks:  1. Patient will be independent and compliant with HEP to achieve other goals. Status at eval: issued and reviewed HEP  Last PN: Compliant with HEP  Current: Non compliant last two weeks     Long Term Goals: To be accomplished in 4 weeks:  1. Improve FOTO score >/= 67/100 to indicate decreased pain with ADL's  Status at eval: 54/100  Last PN: 54/100 at last visit however reports 70% improvement     4. Increase right shoulder AROM IR >/= T9 level to reach behind back with decreased difficulty. Status at eval: L2 level  Last PN: Improved  Current:  No change since last PN        New Goal: Increase right shoulder strength >/= 4+/5 to normalize function.   Current Status: NT  Current: No change since last PN       PLAN  []  Upgrade activities as tolerated     [x]  Continue plan of care  []  Update interventions per flow sheet       []  Discharge due to:_  []  Other:_      Taty Chaudhry PTA 3/16/2018  1:38 PM    Future Appointments  Date Time Provider Eduardo Ludwig   3/16/2018 2:30 PM ALONSO Chester THE [facility]   3/20/2018 11:00 AM ALONSO Craig THE [facility]   3/22/2018 10:00 AM ALONSO Craig THE [facility]   3/26/2018 11:45 AM ALONSO Craig THE [facility]   3/29/2018 3:00 PM ALONSO Craig THE [facility]

## 2018-03-19 ENCOUNTER — APPOINTMENT (OUTPATIENT)
Dept: PHYSICAL THERAPY | Age: 53
End: 2018-03-19
Payer: SELF-PAY

## 2018-03-20 ENCOUNTER — APPOINTMENT (OUTPATIENT)
Dept: PHYSICAL THERAPY | Age: 53
End: 2018-03-20
Payer: SELF-PAY

## 2018-03-22 ENCOUNTER — HOSPITAL ENCOUNTER (OUTPATIENT)
Dept: PHYSICAL THERAPY | Age: 53
Discharge: HOME | End: 2018-03-22
Payer: SELF-PAY

## 2018-03-22 PROCEDURE — 97112 NEUROMUSCULAR REEDUCATION: CPT

## 2018-03-22 PROCEDURE — 97140 MANUAL THERAPY 1/> REGIONS: CPT

## 2018-03-22 PROCEDURE — 97016 VASOPNEUMATIC DEVICE THERAPY: CPT

## 2018-03-22 NOTE — PROGRESS NOTES
PT DAILY TREATMENT NOTE     Patient Name: Placido Pollock  Date:3/22/2018  : 1965  [x]  Patient  Verified  Payor: SELF PAY / Plan: [facility] SELF PAY / Product Type: Self Pay /    In time:105  Out time:155  Total Treatment Time (min): 50  Visit #: 14 of 18    Treatment Area: Right shoulder pain [M25.511]    SUBJECTIVE  Pain Level (0-10 scale): 3-4/10  Any medication changes, allergies to medications, adverse drug reactions, diagnosis change, or new procedure performed?: [x] No    [] Yes (see summary sheet for update)  Subjective functional status/changes:   [] No changes reported  Pt reports that it's been a bad week.   She states that she doesn't know why her neck is so painful    OBJECTIVE    Modality rationale: decrease inflammation and decrease pain to improve the patients ability to complete ADLs   Min Type Additional Details    [] Estim:  []Unatt       []IFC  []Premod                        []Other:  []w/ice   []w/heat  Position:  Location:    [] Estim: []Att    []TENS instruct  []NMES                    []Other:  []w/US   []w/ice   []w/heat  Position:  Location:    []  Traction: [] Cervical       []Lumbar                       [] Prone          []Supine                       []Intermittent   []Continuous Lbs:  [] before manual  [] after manual    []  Ultrasound: []Continuous   [] Pulsed                           []1MHz   []3MHz W/cm2:  Location:    []  Iontophoresis with dexamethasone         Location: [] Take home patch   [] In clinic    []  Ice     []  heat  []  Ice massage  []  Laser   []  Anodyne Position:  Location:    []  Laser with stim  []  Other:  Position:  Location:   10 [x]  Vasopneumatic Device Pressure:       [] lo [x] med [] hi   Temperature: [] lo [x] med [] hi   [x] Skin assessment post-treatment:  [x]intact [x]redness- no adverse reaction    []redness  adverse reaction:      30 min Neuromuscular Re-education:  []  See flow sheet :   Rationale: increase ROM, increase strength, improve coordination and increase proprioception  to improve the patients ability to complete ADLs    10 min Manual Therapy:  MFR to anterior/posterior right shoulder, DTM to scalenes, SCM at clavicle, PROM   Rationale: decrease pain, increase ROM, increase tissue extensibility, decrease edema  and decrease trigger points to complete ADLs          With   [] TE   [] TA   [x] neuro   [] other: Patient Education: [x] Review HEP    [] Progressed/Changed HEP based on:   [x] positioning   [x] body mechanics   [] transfers   [] heat/ice application    [x] other: cervical flexion and cervical extension     Other Objective/Functional Measures:       Pain Level (0-10 scale) post treatment: 4/10    ASSESSMENT/Changes in Function: Pt continues to compensate with shoulder weakness by using upper trapezius contributing to ongoing pain. She continues to demonstrate myofascial tension in scalenes, upper trap and levator scap on clavicle contrbuting to pain however pt has full PROM of shoulder and WNL AROM. Added cervical flexion strengthening and cervical extension isometrics to improve neck mobility and reduce tension on shoulder. Patient will continue to benefit from skilled PT services to address functional mobility deficits, address ROM deficits, address strength deficits, analyze and address soft tissue restrictions, analyze and cue movement patterns, analyze and modify body mechanics/ergonomics and assess and modify postural abnormalities to attain remaining goals. []  See Plan of Care  []  See progress note/recertification  []  See Discharge Summary         Progress towards goals / Updated goals:  Short Term Goals: To be accomplished in 2 weeks:  1. Patient will be independent and compliant with HEP to achieve other goals. Status at eval: issued and reviewed HEP  Last PN: Compliant with HEP  Current: Non compliant last two weeks      Long Term Goals: To be accomplished in 4 weeks:  1.  Improve FOTO score >/= 67/100 to indicate decreased pain with ADL's  Status at eval: 54/100  Last PN: 54/100 at last visit however reports 70% improvement      4. Increase right shoulder AROM IR >/= T9 level to reach behind back with decreased difficulty. Status at eval: L2 level  Last PN: Improved  Current:  No change since last PN          New Goal: Increase right shoulder strength >/= 4+/5 to normalize function.   Current Status: NT  Current: No change since last PN       PLAN  []  Upgrade activities as tolerated     []  Continue plan of care  []  Update interventions per flow sheet       []  Discharge due to:_  []  Other:_      Karli Zamorano PTA 3/22/2018  12:44 PM    Future Appointments  Date Time Provider Eduardo Ludwig   3/22/2018 1:00 PM Samia SANDOVAL [facility]   3/26/2018 11:45 AM ALONSO Schumacher [facility]   3/29/2018 3:00 PM ALONSO Schumacher [facility]

## 2018-03-26 ENCOUNTER — APPOINTMENT (OUTPATIENT)
Dept: PHYSICAL THERAPY | Age: 53
End: 2018-03-26
Payer: SELF-PAY

## 2018-03-29 ENCOUNTER — HOSPITAL ENCOUNTER (OUTPATIENT)
Dept: PHYSICAL THERAPY | Age: 53
Discharge: HOME | End: 2018-03-29
Payer: SELF-PAY

## 2018-03-29 PROCEDURE — 97112 NEUROMUSCULAR REEDUCATION: CPT

## 2018-03-29 PROCEDURE — 97016 VASOPNEUMATIC DEVICE THERAPY: CPT

## 2018-03-29 NOTE — PROGRESS NOTES
[facility] at [address] [place] [address]  Phone: [phone]   Fax: [phone]    Progress Note  Patient name: Geno Warner Start of Care: 18   Referral source: Chloé Manzo MD : 1965   Medical/Treatment Diagnosis: Right shoulder pain [M25.511] Onset Date:summer 2017     Prior Hospitalization: see medical history Provider#: [id number]   Medications: Verified on Patient Summary List    Comorbidities: heart disease, fibromyalgia  Prior Level of Function:normal activity, no deficits right UE    Visits from Start of Care: 15    Missed Visits: 1    Established Goals:          Excellent Good         Limited           None  [x] Increased ROM   []  [x]  [x]  []  [x] Increased Strength  []  []  [x]  [x]  [] Increased Mobility  []  []  []  []   [x] Decreased Pain   []  [x]  [x]  []  [] Decreased Swelling  []  []  []  []    Key Functional Changes: Pt demonstrates no change in strength however some change in mobility since last reassessment due to intermittent compliance with therapy schedule and HEP. Added closed chain activities to promote thoracic strength for patient to reduce overcompensation of upper trap that has been noted numerous times over last two months of care. She feels her shoulder is 70% improved since start of care with very little pain noted except for some motions however her neck seems to still be a problem and she can't seem to learn how to relax her shoulders. Pt would benefit from additional, skilled PT intervention to continue to address her deficits and promote independence with HEP in prep for D/C.     Updated Goals: to be achieved in 2 weeks:  Short Term Goals: To be accomplished in 2 weeks:  1. Patient will be independent and compliant with HEP to achieve other goals. Status at eval: issued and reviewed HEP  Current: Moderately compliant with HEP  Long Term Goals: To be accomplished in 4 weeks:  1.  Improve FOTO score >/= 67/100 to indicate decreased pain with ADL's  Status at eval: 54/100  Current:62/100 PROGRESSING  4. Increase right shoulder AROM IR >/= T9 level to reach behind back with decreased difficulty. Status at eval: L2 level  Current: Improved MET: t9  Updated Goals: to be achieved in 4 weeks:  Continue with unmet goal above. New Goal: Increase right shoulder strength >/= 4+/5 to normalize function. Current Status: No change    ASSESSMENT/RECOMMENDATIONS:  [x]Continue therapy per initial plan/protocol at a frequency of  2 x per week for 2 weeks  []Continue therapy with the following recommended changes:_____________________      _____________________________________________________________________  []Discontinue therapy progressing towards or have reached established goals  []Discontinue therapy due to lack of appreciable progress towards goals  []Discontinue therapy due to lack of attendance or compliance  []Await Physician's recommendations/decisions regarding therapy  []Other:________________________________________________________________    Thank you for this referral.   Kesha Rubio, PT 3/29/2018 4:57 PM  NOTE TO PHYSICIAN:  [address] AND   FAX TO [facility]: [phone]  If you are unable to process this request in 24 hours please contact our office:   [phone]  []  I have read the above report and request that my patient continue as recommended. []  I have read the above report and request that my patient continue therapy with the following changes/special instructions:________________________________________  []I have read the above report and request that my patient be discharged from therapy.     Physicians signature: ______________________________Date: ______Time:______
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
## 2018-04-10 ENCOUNTER — HOSPITAL ENCOUNTER (OUTPATIENT)
Dept: PHYSICAL THERAPY | Age: 53
Discharge: HOME | End: 2018-04-10

## 2018-04-10 PROCEDURE — 97110 THERAPEUTIC EXERCISES: CPT

## 2018-04-10 PROCEDURE — 97112 NEUROMUSCULAR REEDUCATION: CPT

## 2018-04-10 NOTE — PROGRESS NOTES
PT DAILY TREATMENT NOTE     Patient Name: Neptali Lopez  LSMC:  : 1965  [x]  Patient  Verified  Payor: SELF PAY / Plan: [facility] SELF PAY / Product Type: Self Pay /    In time:1000  Out time:1055  Total Treatment Time (min): 55  Visit #: 16 of 19    Treatment Area: Right shoulder pain [M25.511]    SUBJECTIVE  Pain Level (0-10 scale): 5/10  Any medication changes, allergies to medications, adverse drug reactions, diagnosis change, or new procedure performed?: [x] No    [] Yes (see summary sheet for update)  Subjective functional status/changes:   [] No changes reported  This muscle between my neck and shoulder hurts and it stays tight.     OBJECTIVE    Modality rationale: decrease inflammation and decrease pain to improve the patients ability to increase activity/position tolerance   Min Type Additional Details    [] Estim:  []Unatt       []IFC  []Premod                        []Other:  []w/ice   []w/heat  Position:  Location:    [] Estim: []Att    []TENS instruct  []NMES                    []Other:  []w/US   []w/ice   []w/heat  Position:  Location:    []  Traction: [] Cervical       []Lumbar                       [] Prone          []Supine                       []Intermittent   []Continuous Lbs:  [] before manual  [] after manual    []  Ultrasound: []Continuous   [] Pulsed                           []1MHz   []3MHz W/cm2:  Location:    []  Iontophoresis with dexamethasone         Location: [] Take home patch   [] In clinic   10 [x]  Ice     []  heat  []  Ice massage  []  Laser   []  Anodyne Position: seated  Location:neck    []  Laser with stim  []  Other:  Position:  Location:    []  Vasopneumatic Device Pressure:       [] lo [] med [] hi   Temperature: [] lo [] med [] hi   [] Skin assessment post-treatment:  []intact []redness- no adverse reaction    []redness  adverse reaction:      min []Eval                  []Re-Eval       30 min Therapeutic Exercise:  [x] See flow sheet :   Rationale: increase ROM and increase strength to improve the patients ability to utilize right UE with ADL's     min Therapeutic Activity:  []  See flow sheet :        15 min Neuromuscular Re-education:  [x]  See flow sheet :   Rationale: increase strength and increase proprioception  to improve the patients ability to normalize function     min Manual Therapy:          min Gait Training:  ___ feet with ___ device on level surfaces with ___ level of assist   Rationale: With   [] TE   [] TA   [] neuro   [] other: Patient Education: [x] Review HEP    [] Progressed/Changed HEP based on:   [] positioning   [] body mechanics   [] transfers   [] heat/ice application    [] other:      Other Objective/Functional Measures: none taken today     Pain Level (0-10 scale) post treatment: 3/10    ASSESSMENT/Changes in Function: Pain persists about right cervical musculature region. Patient will continue to benefit from skilled PT services to modify and progress therapeutic interventions, address ROM deficits, address strength deficits, analyze and address soft tissue restrictions, analyze and cue movement patterns, analyze and modify body mechanics/ergonomics and assess and modify postural abnormalities to attain remaining goals. []  See Plan of Care  []  See progress note/recertification  []  See Discharge Summary         Progress towards goals / Updated goals:  Short Term Goals: To be accomplished in 2 weeks:  1. Patient will be independent and compliant with HEP to achieve other goals. Status at eval: issued and reviewed HEP  Status at last note: Moderately compliant with HEP  Current: NT    Long Term Goals: To be accomplished in 4 weeks:  1. Improve FOTO score >/= 67/100 to indicate decreased pain with ADL's  Status at eval: 54/100  Status at last note:62/100 PROGRESSING   Current: NT    Updated Goals: to be achieved in 4 weeks:  Continue with unmet goal above.   New Goal: Increase right shoulder strength >/= 4+/5 to normalize function.   Status at last note: no change  Current Status: NT    PLAN  [x]  Upgrade activities as tolerated     [x]  Continue plan of care  []  Update interventions per flow sheet       []  Discharge due to:_   []  Other:_      Mena Hernandez, PT 4/10/2018  10:09 AM    Future Appointments  Date Time Provider Eduardo Ludwig   4/20/2018 10:15 AM Brittani Moran PTA [facility]

## 2018-04-12 ENCOUNTER — HOSPITAL ENCOUNTER (OUTPATIENT)
Dept: PHYSICAL THERAPY | Age: 53
Discharge: HOME | End: 2018-04-12

## 2018-04-12 PROCEDURE — 97530 THERAPEUTIC ACTIVITIES: CPT

## 2018-04-12 PROCEDURE — 97112 NEUROMUSCULAR REEDUCATION: CPT

## 2018-04-12 PROCEDURE — 97110 THERAPEUTIC EXERCISES: CPT
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
## 2018-04-18 ENCOUNTER — HOSPITAL ENCOUNTER (OUTPATIENT)
Dept: PHYSICAL THERAPY | Age: 53
Discharge: HOME | End: 2018-04-18

## 2018-04-18 PROCEDURE — 97112 NEUROMUSCULAR REEDUCATION: CPT

## 2018-04-18 NOTE — PROGRESS NOTES
PT DAILY TREATMENT NOTE     Patient Name: Sherrie Mendes  [id number]  : 1965  [x]  Patient  Verified  Payor: SELF PAY / Plan: [facility] SELF PAY / Product Type: Self Pay /    In time:1040  Out time:  1115  Total Treatment Time (min): 35  Visit #: 18 of 19    Treatment Area: Right shoulder pain [M25.511]    SUBJECTIVE  Pain Level (0-10 scale):0-4/10 depends on movement in shoulder  Any medication changes, allergies to medications, adverse drug reactions, diagnosis change, or new procedure performed?: [x] No    [] Yes (see summary sheet for update)  Subjective functional status/changes:   [] No changes reported  Pt reports that she is compliant with HEP. Pt reports that she is able to  her arm in all planes of motion but she still has pain where she had her original injury. She reports that she rarely uses ice though. OBJECTIVE        35 min Neuromuscular Re-education:  []  See flow sheet :   Rationale: increase ROM, increase strength, improve coordination and increase proprioception  to improve the patients ability to complete ADLs          With   [] TE   [] TA   [x] neuro   [] other: Patient Education: [x] Review HEP    [] Progressed/Changed HEP based on:   [x] positioning   [x] body mechanics   [] transfers   [] heat/ice application    [x] other:reviewed HEP      Other Objective/Functional Measures:     Pain Level (0-10 scale) post treatment: 0/10    ASSESSMENT/Changes in Function: Pt unable to progress with repetitions today and has had difficulty progressing despite AROM WNL for the past month. Pt's noted pain not consistent with activities.   Therapist and pt determined she would discharge today to complete HEP at home and see MD if her symptoms in her right shoulder worsen or persist.      Patient will continue to benefit from skilled PT services to address functional mobility deficits, address ROM deficits, address strength deficits, analyze and address soft tissue restrictions, analyze and cue movement patterns, analyze and modify body mechanics/ergonomics, assess and modify postural abnormalities, address imbalance/dizziness and instruct in home and community integration to attain remaining goals. []  See Plan of Care  []  See progress note/recertification  []  See Discharge Summary         Progress towards goals / Updated goals:  Short Term Goals: To be accomplished in 2 weeks:  1. Patient will be independent and compliant with HEP to achieve other goals. Status at eval: issued and reviewed HEP  Status at last note: Moderately compliant with HEP  Current:MET: mostly compliant but independent      Long Term Goals: To be accomplished in 4 weeks:  1. Improve FOTO score >/= 67/100 to indicate decreased pain with ADL's  Status at eval: 54/100  Status at last note:62/100 PROGRESSING   Current: NEARLY MET: 62/100 (No change since last PN)      Updated Goals: to be achieved in 4 weeks:  Continue with unmet goal above. New Goal: Increase right shoulder strength >/= 4+/5 to normalize function.   Status at last note: no change  Current Status: 4+/5 MET          PLAN  []  Upgrade activities as tolerated     []  Continue plan of care  []  Update interventions per flow sheet       [x]  Discharge due to:_goals met  []  Other:_      Lupe iVvar PTA 4/18/2018  9:04 AM    Future Appointments  Date Time Provider Eduardo Ludwig   4/18/2018 10:30 AM ALONSO Linares [facility]   4/20/2018 3:00 PM ALONSO Ayala [facility]   4/24/2018 10:00 AM ALONSO Ayala [facility]   4/26/2018 10:30 AM Kesha Rubio, PT LORI [facility]

## 2018-04-20 ENCOUNTER — APPOINTMENT (OUTPATIENT)
Dept: PHYSICAL THERAPY | Age: 53
End: 2018-04-20

## 2018-04-24 ENCOUNTER — APPOINTMENT (OUTPATIENT)
Dept: PHYSICAL THERAPY | Age: 53
End: 2018-04-24

## 2018-04-26 ENCOUNTER — APPOINTMENT (OUTPATIENT)
Dept: PHYSICAL THERAPY | Age: 53
End: 2018-04-26

## 2018-08-07 ENCOUNTER — HOSPITAL ENCOUNTER (OUTPATIENT)
Dept: PHYSICAL THERAPY | Age: 53
Discharge: HOME | End: 2018-08-07

## 2018-08-07 PROCEDURE — 97110 THERAPEUTIC EXERCISES: CPT | Performed by: PHYSICAL THERAPIST

## 2018-08-07 PROCEDURE — 97161 PT EVAL LOW COMPLEX 20 MIN: CPT | Performed by: PHYSICAL THERAPIST
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
## 2018-08-09 ENCOUNTER — HOSPITAL ENCOUNTER (OUTPATIENT)
Dept: PHYSICAL THERAPY | Age: 53
Discharge: HOME | End: 2018-08-09

## 2018-08-09 PROCEDURE — 97112 NEUROMUSCULAR REEDUCATION: CPT | Performed by: PHYSICAL THERAPIST

## 2018-08-09 PROCEDURE — 97110 THERAPEUTIC EXERCISES: CPT | Performed by: PHYSICAL THERAPIST

## 2018-08-09 NOTE — PROGRESS NOTES
PT DAILY TREATMENT NOTE     Patient Name: Doug Ortiz  TBSD:[id number]  : 1965  [x]  Patient  Verified  Payor: /    In time:10:13  Out time:11:05  Total Treatment Time (min): 46  Visit #: 2 of 8    Treatment Area: Cervical pain [M54.2]    SUBJECTIVE  Pain Level (0-10 scale): 7-8  Any medication changes, allergies to medications, adverse drug reactions, diagnosis change, or new procedure performed?: [x] No    [] Yes (see summary sheet for update)  Subjective functional status/changes:   [] No changes reported  Feels alright. Continues to have a bit of neck pain. Feels like the shrugs cause the muscles to     OBJECTIVE    30 min Therapeutic Exercise:  [x] See flow sheet :   Rationale: increase ROM, increase strength and decrease pain to improve the patients ability to complete ADLs    22 min Neuromuscular Re-education:  [x]  See flow sheet :   Rationale: improve coordination, improve balance and increase proprioception  to improve the patients ability to complete ADLs    Access Code: [email or web address]   URL: [employer]   Date: 2018   Prepared by: Justin Hubbard     Exercises   Seated Gaze Stabilization with Head Rotation - 10 reps - 3 sets - 1x daily - 7x weekly   Seated Gaze Stabilization with Head Nod - 10 reps - 3 sets - 1x daily - 7x weekly   Seated Horizontal Smooth Pursuit - 10 reps - 3 sets - 1x daily - 7x weekly   Seated Diagonal Smooth Pursuit - 10 reps - 3 sets - 1x daily - 7x weekly     Access Code: [email or web address]   URL: [email or web address]   Date: 2018   Prepared by: Justin Chough     Exercises   Standing Shoulder Row with Anchored Resistance - 10 reps - 2 sets - 3x weekly   Shoulder Extension with Resistance - 10 reps - 2 sets - 3x weekly   Standing Shoulder Horizontal Abduction with Resistance - 10 reps - 2 sets - 3x weekly   Shoulder Overhead Press in Abduction with Dumbbells - 10 reps - 2 sets - 3x weekly           With   [] TE   [] TA   [] neuro   [] other: Patient Education: [x] Review HEP    [] Progressed/Changed HEP based on:   [] positioning   [] body mechanics   [] transfers   [] heat/ice application    [] other:      Other Objective/Functional Measures: smooth pursuit, saccades intact     Pain Level (0-10 scale) post treatment: 7    ASSESSMENT/Changes in Function: Patient responds well to treatment session. Is challenged by VOR exercises. Will hold on diagonal exercises until next session. Progress as tolerated. No adverse effects were noted from today's treatment session      Patient will continue to benefit from skilled PT services to modify and progress therapeutic interventions, address functional mobility deficits, address ROM deficits, address strength deficits, analyze and address soft tissue restrictions, analyze and cue movement patterns, analyze and modify body mechanics/ergonomics and assess and modify postural abnormalities to attain remaining goals. []  See Plan of Care  []  See progress note/recertification  []  See Discharge Summary         Progress towards goals / Updated goals:  Short Term Goals: To be accomplished in 2 weeks:                        Patient will report compliance with HEP 1x/day to aid in rehabilitation program.                        Status at IE: NA                        Current:                            Patient will increase cervical ROM to 100% and pain free in all planes to aid in completion of ADLs. Status at IE:75% in SB with pain in all planes                        Current:     Long Term Goals: To be accomplished in 4 weeks:                        Patient will increase B UE strength to 5/5 MMT throughout to aid in completion of ADLs. Status at IE:4/5 right shoulder abd                        Current:                           Patient will report pain no greater than 1-2/10 throughout entire day to aid in completion of ADLs.                         Status at IE:6-10/10 Current:                           Patent will report sleeping for at least 6 hrs in a succession in at least 3nights in a week to aid in reduction of pain and aid in rehabilitation. Status at IE: sleeps for only 3-4 hrs                         Current:                           Patient will improve FOTO score to 56 points to demonstrate improvement in functional status.                         Status at IE:42                        Current:    PLAN  []  Upgrade activities as tolerated     [x]  Continue plan of care  []  Update interventions per flow sheet       []  Discharge due to:_  []  Other:_      Lc Espinosa PT, DPT 8/9/2018  10:21 AM    Future Appointments  Date Time Provider Eduardo Ludwig   8/14/2018 12:00 PM Gloria Francois PTA [facility]   8/17/2018 11:00 AM Lc Espinosa PT, DPT [facility]   8/21/2018 2:00 PM Lc Espinosa PT, DPT [facility]   8/24/2018 11:00 AM Lc Espinosa PT, DPT [facility]   8/27/2018 10:00 AM Lc Espinosa PT, DPT [facility]   8/29/2018 10:00 AM Lc Espinosa PT, DPT [facility]

## 2018-08-14 ENCOUNTER — HOSPITAL ENCOUNTER (OUTPATIENT)
Dept: PHYSICAL THERAPY | Age: 53
Discharge: HOME | End: 2018-08-14

## 2018-08-14 PROCEDURE — 97530 THERAPEUTIC ACTIVITIES: CPT

## 2018-08-14 PROCEDURE — 97110 THERAPEUTIC EXERCISES: CPT

## 2018-08-14 NOTE — PROGRESS NOTES
PT DAILY TREATMENT NOTE     Patient Name: Rowan Plan  Date:2018  : 1965  [x]  Patient  Verified  Payor: /    In time:12:04  Out time: 12:57  Total Treatment Time (min): 48  Visit #: 3 of 8    Treatment Area: Cervical pain [M54.2]    SUBJECTIVE  Pain Level (0-10 scale): 8-9/10  Any medication changes, allergies to medications, adverse drug reactions, diagnosis change, or new procedure performed?: [x] No    [] Yes (see summary sheet for update)  Subjective functional status/changes:   [] No changes reported  \"I just have so much pain in my neck. \"    OBJECTIVE      40 min Therapeutic Exercise:  [x] See flow sheet :   Rationale: increase ROM, increase strength and improve coordination to improve the patients ability to perform ADLs with less pain. 13 min Therapeutic Activity:  [x]  See flow sheet :   Rationale: increase ROM, increase strength and improve coordination  to improve the patients ability to perform ADLs with less pain. With   [] TE   [] TA   [] neuro   [] other: Patient Education: [x] Review HEP    [] Progressed/Changed HEP based on:   [] positioning   [] body mechanics   [] transfers   [] heat/ice application    [] other:      Other Objective/Functional Measures:      Pain Level (0-10 scale) post treatment: 7/10    ASSESSMENT/Changes in Function: Pt tolerated ex moderately well with some discomfort throughout but not above tolerance. Pt tolerated D2 ex with decreased resistance band level to more tolerable level. Patient will continue to benefit from skilled PT services to modify and progress therapeutic interventions, address functional mobility deficits, address ROM deficits, address strength deficits, analyze and address soft tissue restrictions, analyze and cue movement patterns, analyze and modify body mechanics/ergonomics and assess and modify postural abnormalities to attain remaining goals.      []  See Plan of Care  []  See progress note/recertification  [] See Discharge Summary         Progress towards goals / Updated goals:  Short Term Goals: To be accomplished in 2 weeks:                        WYSNDBF will report compliance with HEP 1x/day to aid in rehabilitation program.                        YAIRQD at IE: NA                        Current:                            Patient will increase cervical ROM to 100% and pain free in all planes to aid in completion of ADLs.                       SXNWUD at IE:75% in SB with pain in all planes                        Current:      Long Term Goals: To be accomplished in 4 weeks:                        Patient will increase B UE strength to 5/5 MMT throughout to aid in completion of ADLs.                       WDFCXC at IE:4/5 right shoulder abd                        Current:                            Patient will report pain no greater than 1-2/10 throughout entire day to aid in completion of ADLs.                       PYOCOJ at IE:6-10/10                        Current:                            Patent will report sleeping for at least 6 hrs in a succession in at least 3nights in a week to aid in reduction of pain and aid in rehabilitation.                        Status at IE: sleeps for only 3-4 hrs                         Current:                            Patient will improve FOTO score to 56 points to demonstrate improvement in functional status.                         TJLPFV at IE:42                        Current:    PLAN  [x]  Upgrade activities as tolerated     [x]  Continue plan of care  []  Update interventions per flow sheet       []  Discharge due to:_  []  Other:_      Hong Ybarra PTA 8/14/2018  2:14 PM    Future Appointments  Date Time Provider Eduardo Ludwig   8/17/2018 11:00 AM Prabhakar Sanchez PT, DPT [facility]   8/21/2018 2:00 PM Prabhakar Sanchez PT, GURJIT [facility]   8/24/2018 11:00 AM Prabhakar Sanchez PT, DPT [facility]   8/27/2018 10:00 AM Prabhakar Sanchez PT, DPT [facility]   8/29/2018 10:00 AM Rashmi Wing, PT, DPT [facility]

## 2018-08-17 ENCOUNTER — HOSPITAL ENCOUNTER (OUTPATIENT)
Dept: PHYSICAL THERAPY | Age: 53
Discharge: HOME | End: 2018-08-17

## 2018-08-17 PROCEDURE — 97110 THERAPEUTIC EXERCISES: CPT | Performed by: PHYSICAL THERAPIST

## 2018-08-17 PROCEDURE — 97530 THERAPEUTIC ACTIVITIES: CPT | Performed by: PHYSICAL THERAPIST

## 2018-08-17 NOTE — PROGRESS NOTES
PT DAILY TREATMENT NOTE     Patient Name: Noble Nichols  Date:2018  : 1965  [x]  Patient  Verified  Payor: /    In time:11:05  Out time:12:01  Total Treatment Time (min): 64  Visit #: 4 of 8    Treatment Area: Cervical pain [M54.2]    SUBJECTIVE  Pain Level (0-10 scale): 6-7  Any medication changes, allergies to medications, adverse drug reactions, diagnosis change, or new procedure performed?: [x] No    [] Yes (see summary sheet for update)  Subjective functional status/changes:   [] No changes reported  Feels good. No new issues. OBJECTIVE    30 min Therapeutic Exercise:  [x] See flow sheet :   Rationale: increase ROM, increase strength and decrease pain to improve the patients ability to complete ADLs    26 min Therapeutic Activity:  [x]  See flow sheet :   Rationale: increase ROM, increase strength and improve coordination  to improve the patients ability to complete ADLs         With   [] TE   [] TA   [] neuro   [] other: Patient Education: [x] Review HEP    [] Progressed/Changed HEP based on:   [] positioning   [] body mechanics   [] transfers   [] heat/ice application    [] other:      Other Objective/Functional Measures:      Pain Level (0-10 scale) post treatment: 6    ASSESSMENT/Changes in Function: Patient responds well to treatment session. Patient has continued symptoms. Educated on need to continue with exercises. Appears that it could be radicular in nature, but not entirely certain at this point. Provided with nerve glides. Will assess response next visit.  No adverse effects were noted from today's treatment session      Patient will continue to benefit from skilled PT services to modify and progress therapeutic interventions, address functional mobility deficits, address ROM deficits, address strength deficits, analyze and address soft tissue restrictions, analyze and cue movement patterns, analyze and modify body mechanics/ergonomics and assess and modify postural abnormalities to attain remaining goals. []  See Plan of Care  []  See progress note/recertification  []  See Discharge Summary         Progress towards goals / Updated goals:  Short Term Goals: To be accomplished in 2 weeks:                        WDSVKVY will report compliance with HEP 1x/day to aid in rehabilitation program.                        KGJMSO at IE: NA                        Current:                            Patient will increase cervical ROM to 100% and pain free in all planes to aid in completion of ADLs.                       APTPRB at IE:75% in SB with pain in all planes                        Current:      Long Term Goals: To be accomplished in 4 weeks:                        Patient will increase B UE strength to 5/5 MMT throughout to aid in completion of ADLs.                       LGQDQW at IE:4/5 right shoulder abd                        Current:                            Patient will report pain no greater than 1-2/10 throughout entire day to aid in completion of ADLs.                       RDPMIC at IE:6-10/10                        Current:                            Patent will report sleeping for at least 6 hrs in a succession in at least 3nights in a week to aid in reduction of pain and aid in rehabilitation.                        Status at IE: sleeps for only 3-4 hrs                         Current:                            Patient will improve FOTO score to 56 points to demonstrate improvement in functional status.                         CGNGLB at IE:42                        Current:    PLAN  []  Upgrade activities as tolerated     [x]  Continue plan of care  []  Update interventions per flow sheet       []  Discharge due to:_  []  Other:_      Deacon Smith PT, DPT 8/17/2018  11:19 AM    Future Appointments  Date Time Provider Eduardo Ludwig   8/21/2018 2:00 PM Deacon Smith PT, DPT [facility]   8/24/2018 10:00 AM Deacon Smith PT, DPT [facility]   8/27/2018 10:00 AM Ofe Robbins, PT, DPT [facility]   8/29/2018 10:00 AM Ofe Robbins PT, DPT [facility]

## 2018-08-21 ENCOUNTER — HOSPITAL ENCOUNTER (OUTPATIENT)
Dept: PHYSICAL THERAPY | Age: 53
Discharge: HOME | End: 2018-08-21

## 2018-08-21 PROCEDURE — 97110 THERAPEUTIC EXERCISES: CPT | Performed by: PHYSICAL THERAPIST

## 2018-08-21 PROCEDURE — 97530 THERAPEUTIC ACTIVITIES: CPT | Performed by: PHYSICAL THERAPIST

## 2018-08-21 NOTE — PROGRESS NOTES
PT DAILY TREATMENT NOTE     Patient Name: Ismael Garcia  IEXX:  : 1965  [x]  Patient  Verified  Payor: /    In time:2:02  Out time:2:40  Total Treatment Time (min): 40  Visit #: 5 of 8    Treatment Area: Cervical pain [M54.2]    SUBJECTIVE  Pain Level (0-10 scale): 4-5  Any medication changes, allergies to medications, adverse drug reactions, diagnosis change, or new procedure performed?: [x] No    [] Yes (see summary sheet for update)  Subjective functional status/changes:   [] No changes reported  Feels better, still has pain though    OBJECTIVE    30 min Therapeutic Exercise:  [x] See flow sheet :   Rationale: increase ROM, increase strength and decrease pain to improve the patients ability to complete ADLs    10 min Therapeutic Activity:  [x]  See flow sheet :   Rationale: increase ROM, increase strength and improve coordination  to improve the patients ability to complete ADLs          With   [] TE   [] TA   [] neuro   [] other: Patient Education: [x] Review HEP    [] Progressed/Changed HEP based on:   [] positioning   [] body mechanics   [] transfers   [] heat/ice application    [] other:      Other Objective/Functional Measures:      Pain Level (0-10 scale) post treatment: 4-5    ASSESSMENT/Changes in Function: Patient responds well to treatment session. Patient is progressing well. Continues to have popping and clicking in the shoulder. Progress as tolerated. . No adverse effects were noted from today's treatment session      Patient will continue to benefit from skilled PT services to modify and progress therapeutic interventions, address functional mobility deficits, address ROM deficits, address strength deficits, analyze and address soft tissue restrictions, analyze and cue movement patterns, analyze and modify body mechanics/ergonomics and assess and modify postural abnormalities to attain remaining goals.      []  See Plan of Care  []  See progress note/recertification  []  See Discharge Summary         Progress towards goals / Updated goals:  Short Term Goals: To be accomplished in 2 weeks:                        ZLIMRYN will report compliance with HEP 1x/day to aid in rehabilitation program.                        OGBGPU at IE: NA                        Current:                            Patient will increase cervical ROM to 100% and pain free in all planes to aid in completion of ADLs.                       PFATUJ at IE:75% in SB with pain in all planes                        Current:      Long Term Goals: To be accomplished in 4 weeks:                        Patient will increase B UE strength to 5/5 MMT throughout to aid in completion of ADLs.                       PXXIVB at IE:4/5 right shoulder abd                        Current:                            Patient will report pain no greater than 1-2/10 throughout entire day to aid in completion of ADLs.                       IGAZNY at IE:6-10/10                        Current:                            Patent will report sleeping for at least 6 hrs in a succession in at least 3nights in a week to aid in reduction of pain and aid in rehabilitation.                        Status at IE: sleeps for only 3-4 hrs                         Current:                            Patient will improve FOTO score to 56 points to demonstrate improvement in functional status.                         DXCCPR at IE:42                        Current:    PLAN  []  Upgrade activities as tolerated     [x]  Continue plan of care  []  Update interventions per flow sheet       []  Discharge due to:_  []  Other:_      Britta Trotter PT, DPT 8/21/2018  2:15 PM    Future Appointments  Date Time Provider Eduardo Ludwig   8/24/2018 10:00 AM Britta Trotter PT, TAMICAT [facility]   8/27/2018 10:00 AM Britta Trotter PT, TAMICAT [facility]   8/29/2018 10:00 AM Britta Trotter PT, DPT [facility]

## 2018-08-24 ENCOUNTER — HOSPITAL ENCOUNTER (OUTPATIENT)
Dept: PHYSICAL THERAPY | Age: 53
Discharge: HOME | End: 2018-08-24

## 2018-08-24 PROCEDURE — 97530 THERAPEUTIC ACTIVITIES: CPT | Performed by: PHYSICAL THERAPIST

## 2018-08-24 PROCEDURE — 97110 THERAPEUTIC EXERCISES: CPT | Performed by: PHYSICAL THERAPIST

## 2018-08-24 NOTE — PROGRESS NOTES
PT DAILY TREATMENT NOTE     Patient Name: Mayi Donato  Date:2018  : 1965  [x]  Patient  Verified  Payor: /    In time:11:09  Out time:12:03  Total Treatment Time (min): 47  Visit #: 6 of 8    Treatment Area: Cervical pain [M54.2]    SUBJECTIVE  Pain Level (0-10 scale): 4-5  Any medication changes, allergies to medications, adverse drug reactions, diagnosis change, or new procedure performed?: [x] No    [] Yes (see summary sheet for update)  Subjective functional status/changes:   [] No changes reported  Feels more pain. Is just getting a bit frustrated with this. The pain is ongoing. OBJECTIVE    30 min Therapeutic Exercise:  [x] See flow sheet :   Rationale: increase ROM, increase strength and decrease pain to improve the patients ability to complete ADLs    24 min Therapeutic Activity:  [x]  See flow sheet :   Rationale: increase ROM, increase strength and improve coordination  to improve the patients ability to complete ADLs        With   [] TE   [] TA   [] neuro   [] other: Patient Education: [x] Review HEP    [] Progressed/Changed HEP based on:   [] positioning   [] body mechanics   [] transfers   [] heat/ice application    [] other:      Other Objective/Functional Measures: NA     Pain Level (0-10 scale) post treatment: 3-4    ASSESSMENT/Changes in Function: Patient responds well to treatment session. Continues pain levels may be dropping, slowly. Added thoracic mobility exercises. Progress as tolerated. No adverse effects were noted from today's treatment session      Patient will continue to benefit from skilled PT services to modify and progress therapeutic interventions, address functional mobility deficits, address ROM deficits, address strength deficits, analyze and address soft tissue restrictions, analyze and cue movement patterns, analyze and modify body mechanics/ergonomics and assess and modify postural abnormalities to attain remaining goals.      []  See Plan of Care  []  See progress note/recertification  []  See Discharge Summary         Progress towards goals / Updated goals:  Short Term Goals: To be accomplished in 2 weeks:                        WFMTHUR will report compliance with HEP 1x/day to aid in rehabilitation program.                        SFCIKE at IE: NA                        Current:                            Patient will increase cervical ROM to 100% and pain free in all planes to aid in completion of ADLs.                       PJLGAW at IE:75% in SB with pain in all planes                        Current:      Long Term Goals: To be accomplished in 4 weeks:                        Patient will increase B UE strength to 5/5 MMT throughout to aid in completion of ADLs.                       YPOVJF at IE:4/5 right shoulder abd                        Current:                            Patient will report pain no greater than 1-2/10 throughout entire day to aid in completion of ADLs.                       QMABMM at IE:6-10/10                        Current:                            Patent will report sleeping for at least 6 hrs in a succession in at least 3nights in a week to aid in reduction of pain and aid in rehabilitation.                        Status at IE: sleeps for only 3-4 hrs                         Current:                            Patient will improve FOTO score to 56 points to demonstrate improvement in functional status.                         JDHBXX at IE:42                        Current:    PLAN  []  Upgrade activities as tolerated     [x]  Continue plan of care  []  Update interventions per flow sheet       []  Discharge due to:_  []  Other:_      Maximino Paris PT, GURJIT 8/24/2018  11:17 AM    Future Appointments  Date Time Provider Eduardo Ludwig   8/24/2018 11:30 AM Maximino Paris PT, DPYORDY [facility]   8/27/2018 10:00 AM Maximino Paris PT, GURJIT [facility]   8/30/2018 11:30 AM Oli Flores PT [facility]

## 2018-08-27 ENCOUNTER — HOSPITAL ENCOUNTER (OUTPATIENT)
Dept: PHYSICAL THERAPY | Age: 53
Discharge: HOME | End: 2018-08-27

## 2018-08-27 PROCEDURE — 97110 THERAPEUTIC EXERCISES: CPT

## 2018-08-27 NOTE — PROGRESS NOTES
PT DAILY TREATMENT NOTE     Patient Name: Omar Cordero  Date:2018  : 1965  [x]  Patient  Verified  Payor: /    In time:15:29  Out time:15:57  Total Treatment Time (min): 28  Visit #: 7 of 8    Treatment Area: Cervical pain [M54.2]    SUBJECTIVE  Pain Level (0-10 scale): 3-410  Any medication changes, allergies to medications, adverse drug reactions, diagnosis change, or new procedure performed?: [x] No    [] Yes (see summary sheet for update)  Subjective functional status/changes:   [x] No changes reported  \"I had a massage this morning, that's why my neck feels so much better. I couldn't use my arms after doing the one over the towel roll. \"    OBJECTIVE    28 min Therapeutic Exercise:  [x] See flow sheet :   Rationale: increase ROM and increase strength to improve the patients ability to complete her ADLs with decreased pain      With   [x] TE   [] TA   [] neuro   [] other: Patient Education: [x] Review HEP    [] Progressed/Changed HEP based on:   [] positioning   [] body mechanics   [] transfers   [] heat/ice application    [] other:      Other Objective/Functional Measures:   Increased reps/resistance per flow sheet     Pain Level (0-10 scale) post treatment: 3-4/10    ASSESSMENT/Changes in Function: Pt tolerated exercises without adverse effects today. She reported increased bilateral anterior shoulder soreness after completing thoracic extension exercise over towel roll at last visit, so that exercise will be put on hold until her soreness dissipates. Patient will continue to benefit from skilled PT services to modify and progress therapeutic interventions, address functional mobility deficits, address ROM deficits, address strength deficits, analyze and address soft tissue restrictions and analyze and modify body mechanics/ergonomics to attain remaining goals.      []  See Plan of Care  []  See progress note/recertification  []  See Discharge Summary         Progress towards goals / Updated goals:  Short Term Goals: To be accomplished in 2 weeks:                        JFFRFCC will report compliance with HEP 1x/day to aid in rehabilitation program.                        SVOMGX at IE: NA                        Current:                            Patient will increase cervical ROM to 100% and pain free in all planes to aid in completion of ADLs.                       GXXFHS at IE:75% in SB with pain in all planes                        Current:      Long Term Goals: To be accomplished in 4 weeks:                        Patient will increase B UE strength to 5/5 MMT throughout to aid in completion of ADLs.                       TQDRMG at IE:4/5 right shoulder abd                        Current:                            Patient will report pain no greater than 1-2/10 throughout entire day to aid in completion of ADLs.                       DGBTRL at IE:6-10/10                        Current:                            Patent will report sleeping for at least 6 hrs in a succession in at least 3nights in a week to aid in reduction of pain and aid in rehabilitation.                        Status at IE: sleeps for only 3-4 hrs                         Current:                            Patient will improve FOTO score to 56 points to demonstrate improvement in functional status.                         HMAMGZ at IE:42                        Current: 50    PLAN  [x]  Upgrade activities as tolerated     [x]  Continue plan of care  []  Update interventions per flow sheet       []  Discharge due to:_  []  Other:_      Rik Linda PT 8/27/2018  3:31 PM    Future Appointments  Date Time Provider Eduardo Ludwig   8/30/2018 11:30 AM Rik Linda, LILIYA [facility]

## 2018-08-29 ENCOUNTER — APPOINTMENT (OUTPATIENT)
Dept: PHYSICAL THERAPY | Age: 53
End: 2018-08-29

## 2018-08-30 ENCOUNTER — APPOINTMENT (OUTPATIENT)
Dept: PHYSICAL THERAPY | Age: 53
End: 2018-08-30

## 2018-08-31 ENCOUNTER — APPOINTMENT (OUTPATIENT)
Dept: PHYSICAL THERAPY | Age: 53
End: 2018-08-31

## 2018-09-04 ENCOUNTER — HOSPITAL ENCOUNTER (OUTPATIENT)
Dept: PHYSICAL THERAPY | Age: 53
Discharge: HOME | End: 2018-09-04

## 2018-09-04 PROCEDURE — 97110 THERAPEUTIC EXERCISES: CPT

## 2018-09-04 PROCEDURE — 97530 THERAPEUTIC ACTIVITIES: CPT

## 2018-09-04 NOTE — PROGRESS NOTES
PT DAILY TREATMENT NOTE  Patient Name: Carlos Garcia Date:2018 : 1965 [x]  Patient  Verified Payor: /   
In time:2:33  Out time:3:26 Total Treatment Time (min): 53 Visit #: 8 of 8 Treatment Area: Cervical pain [M54.2] SUBJECTIVE Pain Level (0-10 scale): 3-4/10 Any medication changes, allergies to medications, adverse drug reactions, diagnosis change, or new procedure performed?: [x] No    [] Yes (see summary sheet for update) Subjective functional status/changes:   [] No changes reported \"I was nursing it all weekend. It's been painful. My doctor ordered an MRI over the weekend. \" OBJECTIVE 39 min Therapeutic Exercise:  [x] See flow sheet :  
Rationale: increase ROM, increase strength and improve coordination to improve the patients ability to return to prior level of physical activity. 14 min Therapeutic Activity:  [x]  See flow sheet :  
Rationale: increase ROM, increase strength and improve coordination  to improve the patients ability to return to prior level of physical activity. With 
 [] TE 
 [] TA 
 [] neuro 
 [] other: Patient Education: [x] Review HEP [] Progressed/Changed HEP based on:  
[] positioning   [] body mechanics   [] transfers   [] heat/ice application   
[] other:   
 
Other Objective/Functional Measures:   
 
Pain Level (0-10 scale) post treatment: 3/10 ASSESSMENT/Changes in Function: Pt continues to be challenged by therex with ex with no increased pain above initial levels. Pt reports continued ache with ex but no change in level of intensity.   
 
Patient will continue to benefit from skilled PT services to modify and progress therapeutic interventions, address functional mobility deficits, address ROM deficits, address strength deficits, analyze and address soft tissue restrictions, analyze and cue movement patterns, analyze and modify body mechanics/ergonomics and assess and modify postural abnormalities to attain remaining goals. []  See Plan of Care 
[]  See progress note/recertification 
[]  See Discharge Summary Progress towards goals / Updated goals: 
Short Term Goals: To be accomplished in 2 weeks: 
                      LBAWKUQ will report compliance with HEP 1x/day to aid in rehabilitation program. 
                      NFNTKQ at IE: NA 
                      Current: 
   
                      Patient will increase cervical ROM to 100% and pain free in all planes to aid in completion of ADLs.                       ISADKY at IE:75% in SB with pain in all planes 
                      Current: 
   
Long Term Goals: To be accomplished in 4 weeks: 
                      Patient will increase B UE strength to 5/5 MMT throughout to aid in completion of ADLs.                       HULNYA at IE:4/5 right shoulder abd                       Current: 
   
                      VYIQBCP will report pain no greater than 1-2/10 throughout entire day to aid in completion of ADLs.                       LGGIAX at IE:6-10/10 
                      Current: 
   
                      Patent will report sleeping for at least 6 hrs in a succession in at least 3nights in a week to aid in reduction of pain and aid in rehabilitation. 
                      Status at IE: sleeps for only 3-4 hrs  
                      Current: 
   
                      Patient will improve FOTO score to 56 points to demonstrate improvement in functional status.                       TYTEEK at IE:42 
                      Current: 50 PLAN [x]  Upgrade activities as tolerated     [x]  Continue plan of care 
[]  Update interventions per flow sheet      
[]  Discharge due to:_ 
[]  Other:_ Lexis Code, PTA 9/4/2018  2:38 PM 
 
Future Appointments Date Time Provider Eduardo Ludwig 9/7/2018 2:30 PM Chana Hernandez PT [facility]

## 2018-09-07 ENCOUNTER — HOSPITAL ENCOUNTER (OUTPATIENT)
Dept: PHYSICAL THERAPY | Age: 53
Discharge: HOME | End: 2018-09-07

## 2018-09-07 PROCEDURE — 97110 THERAPEUTIC EXERCISES: CPT

## 2018-09-07 NOTE — PROGRESS NOTES
PT DAILY TREATMENT NOTE  Patient Name: Connor Nassar Date:2018 : 1965 [x]  Patient  Verified Payor: /   
In time:  6275 Out time: 12 10 Total Treatment Time (min): 30 Visit #: 9 of   8 Treatment Area: Cervical pain [M54.2] SUBJECTIVE Pain Level (0-10 scale): 4-5/10 Any medication changes, allergies to medications, adverse drug reactions, diagnosis change, or new procedure performed?: [x] No    [] Yes (see summary sheet for update) Subjective functional status/changes:   [] No changes reported Pt states she feels the exercises are good for overall strengthening however reports continued pain that limits sleep to <5 hr /night. \"Im going for an MRI\" (hasn't been scheduled yet.)  Pt reports  She sleeps in prone with cervical rotation; also that not sleeping  affects her  work performance as a  citing \"can't go to work with with [address] eyes\" OBJECTIVE 30 min Therapeutic Exercise:  [x] See flow sheet :  
Rationale:  Increase strength With 
 [] TE 
 [] TA 
 [] neuro 
 [] other: Patient Education: [x] Review HEP [] Progressed/Changed HEP based on:  
[] positioning   [] body mechanics   [] transfers   [] heat/ice application   
[x] other: educated sleeping posture SL and/or supine with pillow/towel roll if supine to position c spine to neutral. Reports she is only able to sleep on her stomach. Other Objective/Functional Measures:   FACES Pain scale 0/10. Demos full AROM c spine. Pain Level (0-10 scale) post treatment:   4-5 ASSESSMENT/Changes in Function:   No functional deficits demo'ed. []  See Plan of Care 
[]  See progress note/recertification 
[]  See Discharge Summary Progress towards goals / Updated goals: 
Short Term Goals: To be accomplished in 2 weeks: 
                      BEFNPOM will report compliance with HEP 1x/day to aid in rehabilitation program. 
                      HTXWWH at IE: NA 
                       Current:  
   
                      DQFEMDP will increase cervical ROM to 100% and pain free in all planes to aid in completion of ADLs.                       EWKJJU at IE:75% in SB with pain in all planes 
                      Current: pt demos full ROM all planes however reports  persistent pain. 
   
Long Term Goals: To be accomplished in 4 weeks: 
                      Patient will increase B UE strength to 5/5 MMT throughout to aid in completion of ADLs.                       PNORKS at IE:4/5 right shoulder abd                       Current: MET 
   
                      VPCIANV will report pain no greater than 1-2/10 throughout entire day to aid in completion of ADLs.                       DATVIJ at IE:6-10/10 
                      Current: NOT MET. Report kali ranges 6-10. 
   
                      Patent will report sleeping for at least 6 hrs in a succession in at least 3nights in a week to aid in reduction of pain and aid in rehabilitation. 
                      Status at IE: sleeps for only 3-4 hrs  
                      Current: partially met. Reports <5 hrs continuous sleep.  
   
                      Patient will improve FOTO score to 56 points to demonstrate improvement in functional status.                       XNLJRP at IE:42 
                      Current: PLAN 
[]  Upgrade activities as tolerated     []  Continue plan of care 
[]  Update interventions per flow sheet      
[]  Discharge due to:_ 
[x]  Other: consulted evaluating PT to update/DC POC Christy Shen, ALONSO 9/7/2018  1:48 PM 
 
No future appointments.

## 2018-09-20 NOTE — PROGRESS NOTES
[facility] 
[address] [facility]  [facility], [address] [phone]  Fx [phone] Physical Therapy Discharge Summary Patient name: Bill Plascencia Start of Care: 2018 Referral source: Argenis Joy MD : 1965 Medical/Treatment Diagnosis: Cervical pain [M54.2] Onset Date:3/18/2018 Prior Hospitalization: see medical history Provider#: [id number] Medications: Verified on Patient Summary List   
Comorbidities: left BBB, CHF, fibromyalgia Prior Level of Function:independent w/ ADLs, exercise Visits from Start of Care: 9    Missed Visits: 0 Reporting Period : 2018 to 2018 Summary of Care: 
Short Term Goals: To be accomplished in 2 weeks: 
                      SSJXUKE will report compliance with HEP 1x/day to aid in rehabilitation program. 
                      VPVJWZ at IE: NA 
                      Current:  
   
                      Patient will increase cervical ROM to 100% and pain free in all planes to aid in completion of ADLs.                       MAFNZP at IE:75% in SB with pain in all planes 
                      Current: pt demos full ROM all planes however reports  persistent pain. 
   
Long Term Goals: To be accomplished in 4 weeks: 
                      Patient will increase B UE strength to 5/5 MMT throughout to aid in completion of ADLs.                       XYATSC at IE:4/5 right shoulder abd                       Current: MET 
   
                      XOOBVOL will report pain no greater than 1-2/10 throughout entire day to aid in completion of ADLs.                       AVRUIH at IE:6-10/10 
                      Current: NOT MET. Report kali ranges 6-10. 
   
                      Patent will report sleeping for at least 6 hrs in a succession in at least 3nights in a week to aid in reduction of pain and aid in rehabilitation. 
                      PHTGIH at IE: sleeps for only 3-4 hrs                       Current: partially met. Reports <5 hrs continuous sleep.  
   
                      Patient will improve FOTO score to 56 points to demonstrate improvement in functional status.                       CMUGUK at IE:42 
                      Current: 
 
 
ASSESSMENT/RECOMMENDATIONS: 
[x]Discontinue therapy: [x]Patient has reached or is progressing toward set goals []Patient is non-compliant or has abdicated 
    []Due to lack of appreciable progress towards set goals Radha Carrasco PT, DPT 9/20/2018 11:53 AM

## 2020-01-03 ENCOUNTER — HOSPITAL ENCOUNTER (OUTPATIENT)
Dept: PHYSICAL THERAPY | Age: 55
Discharge: HOME | End: 2020-01-03
Payer: COMMERCIAL

## 2020-01-03 PROCEDURE — 97110 THERAPEUTIC EXERCISES: CPT | Performed by: PHYSICAL THERAPIST

## 2020-01-03 PROCEDURE — 97161 PT EVAL LOW COMPLEX 20 MIN: CPT | Performed by: PHYSICAL THERAPIST

## 2020-01-03 NOTE — PROGRESS NOTES
[facility] at [address] [place] [address] Sheryle Am[address]: [phone]   Fax: [phone]  PLAN OF CARE / STATEMENT OF MEDICAL NECESSITY FOR PHYSICAL THERAPY SERVICES  Patient Name: Jeremy Hui : 1965   Medical   Diagnosis: Low back pain [M54.5]  Neck pain [M54.2] Treatment Diagnosis: Low back pain, neck pain   Onset Date: 2018     Referral Source: Nedra Whiteside MD Start of Care [facility]): 1/3/2020   Prior Hospitalization: See medical history Provider #: [id number]   Prior Level of Function: Sedentary, independent w/ ADLs   Comorbidities: Left BBB   Medications: Verified on Patient Summary List   The Plan of Care and following information is based on the information from the initial evaluation.   ===========================================================================================  Assessment / key information:  Jeremy Hui is a 53 y.o.  yo female presents with signs/symptoms consistent with non-specific low back pain and neck pain, with intermittent radiating symptoms. Pain in back appears to be reduced with repeated movement.     Patient will continue to benefit from skilled PT services to modify and progress therapeutic interventions, address functional mobility deficits, address ROM deficits, address strength deficits, analyze and address soft tissue restrictions, analyze and cue movement patterns, analyze and modify body mechanics/ergonomics and assess and modify postural abnormalities to attain remaining goals. .    Pt instructed in HEP and will f/u in clinic for PT.  ===========================================================================================  Eval Complexity: History MEDIUM  Complexity : 1-2 comorbidities / personal factors will impact the outcome/ POC ;  Examination  LOW Complexity : 1-2 Standardized tests and measures addressing body structure, function, activity limitation and / or participation in recreation ; Presentation LOW Complexity : Stable, uncomplicated ;  Decision Making MEDIUM Complexity : FOTO score of 26-74; Overall Complexity LOW   Problem List: pain affecting function, decrease ROM, decrease strength, impaired gait/ balance, decrease ADL/ functional abilitiies, decrease activity tolerance, decrease flexibility/ joint mobility and decrease transfer abilities   Treatment Plan may include any combination of the following: Therapeutic exercise, Therapeutic activities, Neuromuscular re-education, Physical agent/modality, Gait/balance training, Manual therapy, Patient education and Self Care training  Patient / Family readiness to learn indicated by: asking questions, trying to perform skills and interest  Persons(s) to be included in education: patient (P)  Barriers to Learning/Limitations: no  Measures Taken: NA  Patient Goal (s): Strengthen, resolve paralysis-numbness in legs, restful sleeping, relieve neck pain   Patient self reported health status: good  Rehabilitation Potential: fair  Goals:  Short Term Goals: To be accomplished in 2 weeks:   Patient will report compliance with HEP 1x/day to aid in rehabilitation program.   Status at IE: NA   Current: Same as IE      Patient will increase lumbar ROM to 100% in all planes to aid in completion of ADLs. Status at IE:75% in SB   Current: Same as IE    Long Term Goals: To be accomplished in 6 weeks:   Patient will increase B LE and UE strength to 5/5 MMT throughout to aid in completion of ADLs. Status at IE:4/5 in hips and shoulder abduction   Current: Same as IE     Patient will report pain no greater than 5/10 throughout entire day to aid in completion of ADLs. Status at IE:5-10   Current: Same as IE     Patent will perform 10sit<>stands with no pain to be able to complete ADLs. Status at [address] [facility] upon standing   Current: Same as IE     Patient will improve FOTO score to 52 points to demonstrate improvement in functional status.    Status at IE:33   Current: Same as IE      *FOTO score is an established functional score where 100 = no disability*    Frequency / Duration:   Patient to be seen 1-2  times per week for 6  weeks:  Patient / Caregiver education and instruction: self care and exercises      . Therapist Signature: Waldo Joseph DPT, OCS Date: 1/6/4619   Certification Period: NA Time: 5:53 PM   ===========================================================================================  I certify that the above Physical Therapy Services are being furnished while the patient is under my care. I agree with the treatment plan and certify that this therapy is necessary. Physician Signature:        Date:       Time:     Please sign and return to [facility] or you may fax the signed copy to [phone]. Thank you.
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
## 2020-01-06 ENCOUNTER — HOSPITAL ENCOUNTER (OUTPATIENT)
Dept: PHYSICAL THERAPY | Age: 55
Discharge: HOME | End: 2020-01-06
Payer: COMMERCIAL

## 2020-01-06 PROCEDURE — 97110 THERAPEUTIC EXERCISES: CPT

## 2020-01-06 NOTE — PROGRESS NOTES
PT DAILY TREATMENT NOTE     Patient Name: Damon Rodney  [facility]:[id number]  : 1965  [x]  Patient  Verified  Payor: Radha Song / Plan: [place] [employer] HMO / Product Type: HMO /    In time:430  Out time:520  Total Treatment Time (min): 50  Visit #: 2 of 6    Treatment Area: Low back pain [M54.5]  Neck pain [M54.2]    SUBJECTIVE  Pain Level (0-10 scale): 6  Any medication changes, allergies to medications, adverse drug reactions, diagnosis change, or new procedure performed?: [x] No    [] Yes (see summary sheet for update)  Subjective functional status/changes:   [] No changes reported    Patient reports that her pain was increased after evaluation activities. She reports that she soul not sleep that night. OBJECTIVE     50 min Therapeutic Exercise:  [x] See flow sheet :   Rationale: increase ROM, increase strength and decrease pain to improve the patients ability to complete ADLs          With   [x] TE   [] TA   [] neuro   [] other: Patient Education: [x] Review HEP    [] Progressed/Changed HEP based on:   [] positioning   [] body mechanics   [] transfers   [] heat/ice application    [] other:      Other Objective/Functional Measures: NA     Pain Level (0-10 scale) post treatment: 3    ASSESSMENT/Changes in Function:   Patient responds well to treatment session as she reported a reduction in low back and neck pain. Patient required cues to promote proper form with cervical loading activities. Explained tissue loading and response to exercise patient demonstrated understating.   No adverse effects were noted from today's treatment session      Patient will continue to benefit from skilled PT services to modify and progress therapeutic interventions, address functional mobility deficits, address ROM deficits, address strength deficits, analyze and address soft tissue restrictions, analyze and cue movement patterns, analyze and modify body mechanics/ergonomics, assess and modify postural abnormalities, instruct in home and community integration. []  See Plan of Care  []  See progress note/recertification  []  See Discharge Summary         Progress towards goals / Updated goals:  Short Term Goals: To be accomplished in 2 weeks:                   Patient will report compliance with HEP 1x/day to aid in rehabilitation program.                   Status at IE: NA                   Current: In-progress introduced forward flexion in seated position 1/6/2020                      Patient will increase lumbar ROM to 100% in all planes to aid in completion of ADLs. Status at IE:75% in SB                   Current: Same as IE     Long Term Goals: To be accomplished in 6 weeks:                   Patient will increase B LE and UE strength to 5/5 MMT throughout to aid in completion of ADLs. Status at IE:4/5 in hips and shoulder abduction                   Current: Same as IE                      Patient will report pain no greater than 5/10 throughout entire day to aid in completion of ADLs. Status at IE:5-10                   Current: Same as IE                      Patent will perform 10sit<>stands with no pain to be able to complete ADLs. Status at [address] [facility] upon standing                   Current: Same as IE                      Patient will improve FOTO score to 52 points to demonstrate improvement in functional status.                    Status at IE:33                   Current: Same as IE                            *FOTO score is an established functional score where 100 = no disability*    PLAN  []  Upgrade activities as tolerated     [x]  Continue plan of care  []  Update interventions per flow sheet       []  Discharge due to:_  []  Other:_      Jim Samuels, PT, DPT 1/6/2020  4:21 PM    Future Appointments   Date Time Provider Eduardo Ludwig   1/6/2020  4:30 PM LILIYA Chan THE [facility]   1/13/2020  5:00 PM Marvin Molina, PT, DPT [facility] 1/20/2020  5:00 PM Regulo Parsons PT, GURJIT FARRELLHPTMAXIME THE [facility]   1/27/2020 11:00 AM Regulo Parsons PT, DPT [facility]   2/3/2020 11:00 AM Regulo Parsons PT, TAMICAT [facility]PTAMADOY [facility]   2/10/2020 11:00 AM Regulo Parsons PT, DPT [facility] THE [facility]

## 2020-01-13 ENCOUNTER — HOSPITAL ENCOUNTER (OUTPATIENT)
Dept: PHYSICAL THERAPY | Age: 55
Discharge: HOME | End: 2020-01-13
Payer: COMMERCIAL

## 2020-01-13 PROCEDURE — 97110 THERAPEUTIC EXERCISES: CPT | Performed by: PHYSICAL THERAPIST

## 2020-01-13 NOTE — PROGRESS NOTES
PT DAILY TREATMENT NOTE    Patient Name: Muna Hunter  FSMZ:[id number]  : 1965  [x]  Patient  Verified  Payor: Sejal Medico / Plan: [employer] HMO / Product Type: HMO /    In time:5:03  Out time:6:05  Total Treatment Time (min): 62  Total Timed Codes (min): 62  Visit #: 3 of 6    Treatment Area: Low back pain [M54.5]  Neck pain [M54.2]    SUBJECTIVE  Pain Level (0-10 scale): 8  Any medication changes, allergies to medications, adverse drug reactions, diagnosis change, or new procedure performed?: [x] No    [] Yes (see summary sheet for update)  Subjective functional status/changes:   [] No changes reported  Felt more pain after over the past few days. Not sure what was going on. OBJECTIVE    62 min Therapeutic Exercise:  [x] See flow sheet :   Rationale: increase ROM, increase strength and decrease pain to improve the patients ability to complete ADLs       With   [] TE   [] TA   [] neuro   [] other: Patient Education: [x] Review HEP    [] Progressed/Changed HEP based on:   [] positioning   [] body mechanics   [] transfers   [] heat/ice application    [] other:      Other Objective/Functional Measures: NA     Pain Level (0-10 scale) post treatment: 5    ASSESSMENT/Changes in Function: Patient responds well to treatment session. Patient appears to perseverate on pathoanatomy issues that may or may not be present. Responds well to activity and encouragement. Will progress as tolerated .  No adverse effects were noted from today's treatment session     Patient will continue to benefit from skilled PT services to modify and progress therapeutic interventions, address functional mobility deficits, address ROM deficits, address strength deficits, analyze and address soft tissue restrictions, analyze and cue movement patterns, analyze and modify body mechanics/ergonomics, assess and modify postural abnormalities    []  See Plan of Care  []  See progress note/recertification  []  See Discharge Summary Progress towards goals / Updated goals:  Short Term Goals: To be accomplished in 2 weeks:                   Patient will report compliance with HEP 1x/day to aid in rehabilitation program.                   Status at IE: NA                   Current: re-educated on HEP, progression                       Patient will increase lumbar ROM to 100% in all planes to aid in completion of ADLs.                  Status at IE:75% in SB                   Current: Same as IE     Long Term Goals: To be accomplished in 6 weeks:                   Patient will increase B LE and UE strength to 5/5 MMT throughout to aid in completion of ADLs.                  Status at IE:4/5 in hips and shoulder abduction                   Current: Same as IE                      Patient will report pain no greater than 5/10 throughout entire day to aid in completion of ADLs.                  Status at IE:5-10                   Current: Same as IE                      Patent will perform 10sit<>stands with no pain to be able to complete ADLs.                  Status at [facility] upon standing                   Current: Same as IE                      Patient will improve FOTO score to 52 points to demonstrate improvement in functional status.                    Status at IE:33                   Current: Same as IE                 *FOTO score is an established functional score where 100 = no disability*    PLAN  []  Upgrade activities as tolerated     [x]  Continue plan of care  []  Update interventions per flow sheet       []  Discharge due to:_  []  Other:_      Sanjay Cavazos PT, DPT 1/13/2020  5:47 PM    Future Appointments   Date Time Provider Eduardo Ludwig   1/22/2020  3:00 PM Madison Flores PT, DPT [facility]PT[facility] THE [facility]   1/27/2020 11:00 AM Madison Flores PT, DPT [facility] THE [facility]   2/3/2020 11:00 AM Madison Flores PT, DPT [facility] THE [facility]   2/10/2020 11:00 AM Madison Flores PT, DPT [facility] THE [facility]

## 2020-01-20 ENCOUNTER — APPOINTMENT (OUTPATIENT)
Dept: PHYSICAL THERAPY | Age: 55
End: 2020-01-20
Payer: COMMERCIAL

## 2020-01-22 ENCOUNTER — HOSPITAL ENCOUNTER (OUTPATIENT)
Dept: PHYSICAL THERAPY | Age: 55
Discharge: HOME | End: 2020-01-22
Payer: COMMERCIAL

## 2020-01-22 PROCEDURE — 97110 THERAPEUTIC EXERCISES: CPT | Performed by: PHYSICAL THERAPIST

## 2020-01-22 NOTE — PROGRESS NOTES
PT DAILY TREATMENT NOTE    Patient Name: Alf Leiva  UHXF:  : 1965  [x]  Patient  Verified  Payor: Sadaf Vilchis / Plan: [employer] HMO / Product Type: HMO /    In time:3:05  Out time:3:52  Total Treatment Time (min): 47  Total Timed Codes (min): 47  1:1 Treatment Time ( only): 52   Visit #: 4 of 6    Treatment Area: Low back pain [M54.5]  Neck pain [M54.2]    SUBJECTIVE  Pain Level (0-10 scale): 6  Any medication changes, allergies to medications, adverse drug reactions, diagnosis change, or new procedure performed?: [x] No    [] Yes (see summary sheet for update)  Subjective functional status/changes:   [] No changes reported  Went walking on Monday. Felt a lot of pain after that day. OBJECTIVE    47 min Therapeutic Exercise:  [x] See flow sheet :   Rationale: increase ROM, increase strength and decrease pain to improve the patients ability to complete ADLs         With   [] TE   [] TA   [] neuro   [] other: Patient Education: [x] Review HEP    [] Progressed/Changed HEP based on:   [] positioning   [] body mechanics   [] transfers   [] heat/ice application    [] other:      Other Objective/Functional Measures: NA     Pain Level (0-10 scale) post treatment: 4    ASSESSMENT/Changes in Function: Patient responds well to treatment session. Is challenged somewhat with hip 3-way due to pain. Is able to resist more weight on leg press than she expected. Reports that she likes this exercise. Does have some snapping hip on the right during right hip extension.  . No adverse effects were noted from today's treatment session     Patient will continue to benefit from skilled PT services to modify and progress therapeutic interventions, address functional mobility deficits, address ROM deficits, address strength deficits, analyze and address soft tissue restrictions, analyze and cue movement patterns, analyze and modify body mechanics/ergonomics, assess and modify postural abnormalities     []  See Plan of Care  []  See progress note/recertification  []  See Discharge Summary         Progress towards goals / Updated goals:  Short Term Goals: To be accomplished in 2 weeks:                   KLRECXW will report compliance with HEP 1x/day to aid in rehabilitation program.                   Status at IE: NA                   Current: re-educated on HEP, progression                       Patient will increase lumbar ROM to 100% in all planes to aid in completion of ADLs.                  Status at IE:75% in SB                   Current:SB 75% still limited 1/22/2020     Long Term Goals: To be accomplished in 6 weeks:                   Patient will increase B LE and UE strength to 5/5 MMT throughout to aid in completion of ADLs.                  Status at IE:4/5 in hips and shoulder abduction                   Current: Same as IE                      Patient will report pain no greater than 5/10 throughout entire day to aid in completion of ADLs.                  Status at IE:5-10                   Current: Same as IE                      Patent will perform 10sit<>stands with no pain to be able to complete ADLs.                  Status at [facility] upon standing                   Current: Same as IE                      Patient will improve FOTO score to 52 points to demonstrate improvement in functional status.                    Status at IE:33                   Current: Same as IE         *FOTO score is an established functional score where 100 = no disability*    PLAN  []  Upgrade activities as tolerated     [x]  Continue plan of care  []  Update interventions per flow sheet       []  Discharge due to:_  []  Other:_      Lc Espinosa PT, DPT 1/22/2020  3:18 PM    Future Appointments   Date Time Provider Eduardo Ludwig   1/27/2020 11:00 AM Jose Luis Sevilla PT, DPT [facility] THE [facility]   1/30/2020 11:30 AM Jose Luis Sevilla PT, DPT [facility] THE [facility]   2/3/2020 11:00 AM Jose Luis Sevilla PT, DPT [facility] THE [facility]   2/6/2020 11:00 AM Diann Villagran TAMICA HuangT [facility]   2/10/2020 11:00 AM Honey Ruiz PT, DPT [facility]   2/13/2020 11:00 AM Honey Ruiz PT, DPT [facility]   2/17/2020 11:00 AM Honey Ruiz PT, DPT [facility]   2/20/2020  1:30 PM Honey Ruiz PT, DPT [facility]

## 2020-01-27 ENCOUNTER — HOSPITAL ENCOUNTER (OUTPATIENT)
Dept: PHYSICAL THERAPY | Age: 55
Discharge: HOME | End: 2020-01-27
Payer: COMMERCIAL

## 2020-01-27 PROCEDURE — 97110 THERAPEUTIC EXERCISES: CPT | Performed by: PHYSICAL THERAPIST

## 2020-01-27 NOTE — PROGRESS NOTES
PT DAILY TREATMENT NOTE    Patient Name: Rowan Plan  AGVV:  : 1965  [x]  Patient  Verified  Payor: Donavon Ruiz / Plan: [employer] / Product Type: HMO /    In time:11:05  Out time:11:45  Total Treatment Time (min): 40  Total Timed Codes (min): 40  Visit #: 5 of 6    Treatment Area: Low back pain [M54.5]  Neck pain [M54.2]    SUBJECTIVE  Pain Level (0-10 scale): 0, but 7/10 when moving   Any medication changes, allergies to medications, adverse drug reactions, diagnosis change, or new procedure performed?: [x] No    [] Yes (see summary sheet for update)  Subjective functional status/changes:   [] No changes reported  Feels okay. Only feels pain when moving a certain way    OBJECTIVE    40 min Therapeutic Exercise:  [x] See flow sheet :   Rationale: increase ROM, increase strength and decrease pain to improve the patients ability to complete ADLs         With   [] TE   [] TA   [] neuro   [] other: Patient Education: [x] Review HEP    [] Progressed/Changed HEP based on:   [] positioning   [] body mechanics   [] transfers   [] heat/ice application    [] other:      Other Objective/Functional Measures: NA     Pain Level (0-10 scale) post treatment: 7    ASSESSMENT/Changes in Function: Patient responds well to treatment session. Minimal difficulty with exercises as prescribed, with exception of leg press. Patient is able to complete exercises as prescribed. Progress as tolerated.  No adverse effects were noted from today's treatment session       Patient will continue to benefit from skilled PT services to modify and progress therapeutic interventions, address functional mobility deficits, address ROM deficits, address strength deficits, analyze and address soft tissue restrictions, analyze and cue movement patterns, analyze and modify body mechanics/ergonomics, assess and modify postural abnormalities    []  See Plan of Care  []  See progress note/recertification  []  See Discharge Summary Progress towards goals / Updated goals:  Short Term Goals: To be accomplished in 2 weeks:                   Patient will report compliance with HEP 1x/day to aid in rehabilitation program.                   Status at IE: NA                   Current: re-educated on HEP, progression                       Patient will increase lumbar ROM to 100% in all planes to aid in completion of ADLs.                  Status at IE:75% in SB                   Current:SB 75% still limited 1/22/2020     Long Term Goals: To be accomplished in 6 weeks:                   Patient will increase B LE and UE strength to 5/5 MMT throughout to aid in completion of ADLs.                  Status at IE:4/5 in hips and shoulder abduction                   Current: Same as IE                      Patient will report pain no greater than 5/10 throughout entire day to aid in completion of ADLs.                  Status at IE:5-10                   Current: 5-10, only pain when moving. Progressing 1/27/20/20                      Patent will perform 10sit<>stands with no pain to be able to complete ADLs.                  Status at [facility] upon standing                   Current: Same as IE                      Patient will improve FOTO score to 52 points to demonstrate improvement in functional status.                    Status at IE:33                   Current: Same as IE                   *FOTO score is an established functional score where 100 = no disability*    PLAN  []  Upgrade activities as tolerated     [x]  Continue plan of care  []  Update interventions per flow sheet       []  Discharge due to:_  []  Other:_      Sanjay Cavazos PT DPYORDY 1/27/2020  11:15 AM    Future Appointments   Date Time Provider Eduardo Ludwig   1/30/2020  3:30 PM Madison Flores PT, DPT [facility]   2/3/2020 11:00 AM Madison Flores PT, DPT [facility]   2/6/2020 11:00 AM Madison Flores PT, DPT [facility]   2/10/2020 11:00 AM Madison Flores PT, DPT [facility] 2/13/2020 11:00 AM Vaibhav Fernandez PT, GURJIT MIHPTMAXIME THE [facility]   2/17/2020 11:00 AM Vaibhav Fernandez PT, GURJIT SANDOVAL THE [facility]   2/20/2020  1:30 PM Vaibhav Fernandez PT, GURJIT SANDOVAL THE [facility]

## 2020-01-30 ENCOUNTER — HOSPITAL ENCOUNTER (OUTPATIENT)
Dept: PHYSICAL THERAPY | Age: 55
Discharge: HOME | End: 2020-01-30
Payer: COMMERCIAL

## 2020-01-30 PROCEDURE — 97110 THERAPEUTIC EXERCISES: CPT | Performed by: PHYSICAL THERAPIST

## 2020-01-30 NOTE — PROGRESS NOTES
PT DAILY TREATMENT NOTE    Patient Name: Emily Pedro  AQAV:   : 1965  [x]  Patient  Verified  Payor: Gerardo Blount / Plan: [employer] / Product Type: HMO /    In time:3:30  Out time:4:13  Total Treatment Time (min): 43  Total Timed Codes (min): 43  Visit #: 6 of 12    Treatment Area: Low back pain [M54.5]  Neck pain [M54.2]    SUBJECTIVE  Pain Level (0-10 scale): 5  Any medication changes, allergies to medications, adverse drug reactions, diagnosis change, or new procedure performed?: [x] No    [] Yes (see summary sheet for update)  Subjective functional status/changes:   [] No changes reported  Feels a little sore/stiff, but no new issues. OBJECTIVE    43 min Therapeutic Exercise:  [x] See flow sheet :   Rationale: increase ROM, increase strength and decrease pain to improve the patients ability to complete ADLs    With   [] TE   [] TA   [] neuro   [] other: Patient Education: [x] Review HEP    [] Progressed/Changed HEP based on:   [] positioning   [] body mechanics   [] transfers   [] heat/ice application    [] other:      Other Objective/Functional Measures: NA     Pain Level (0-10 scale) post treatment: 6    ASSESSMENT/Changes in Function: Patient responds well to treatment session. Patient is progressing well. Continues to report high pain levels, but is tolerated increased levels of activity .  No adverse effects were noted from today's treatment session    Patient will continue to benefit from skilled PT services to modify and progress therapeutic interventions, address functional mobility deficits, address ROM deficits, address strength deficits, analyze and address soft tissue restrictions, analyze and cue movement patterns, analyze and modify body mechanics/ergonomics, assess and modify postural abnormalities    []  See Plan of Care  []  See progress note/recertification  []  See Discharge Summary         Progress towards goals / Updated goals:  Short Term Goals: To be accomplished in 2 weeks:                   Patient will report compliance with HEP 1x/day to aid in rehabilitation program.                   Status at IE: NA                   Current: re-educated on HEP, progression                       Patient will increase lumbar ROM to 100% in all planes to aid in completion of ADLs.                  Status at IE:75% in SB                   Current:SB 75% still limited 1/22/2020     Long Term Goals: To be accomplished in 6 weeks:                   Patient will increase B LE and UE strength to 5/5 MMT throughout to aid in completion of ADLs.                  Status at IE:4/5 in hips and shoulder abduction                   Current: Same as IE                      Patient will report pain no greater than 5/10 throughout entire day to aid in completion of ADLs.                  Status at IE:5-10                   Current: 5-10, only pain when moving. Progressing 1/27/20/20                      Patent will perform 10sit<>stands with no pain to be able to complete ADLs.                  Status at [facility] upon standing                   Current: Same as IE                      Patient will improve FOTO score to 52 points to demonstrate improvement in functional status.                    Status at IE:33                   Current: Same as IE                   *FOTO score is an established functional score where 100 = no disability*    PLAN  []  Upgrade activities as tolerated     [x]  Continue plan of care  []  Update interventions per flow sheet       []  Discharge due to:_  []  Other:_      Wilfredo Moody PT, DPT 1/30/2020  3:41 PM    Future Appointments   Date Time Provider Eduardo Ludwig   2/3/2020 11:00 AM Honey Ruiz PT, DPT [facility]PT[facility]Y THE [facility]   2/6/2020 11:00 AM Honey Ruiz PT, DPT [facility] THE [facility]   2/10/2020 11:00 AM Honey Ruiz PT, DPT [facility] THE [facility]   2/13/2020 11:00 AM Honey Ruiz PT, DPT [facility]   2/17/2020 11:00 AM Honey Ruiz PT, DPT [facility] THE [facility]   2/20/2020  1:30 PM Shabnam Rowe Ramez Wing, DPT [facility]

## 2020-02-03 ENCOUNTER — HOSPITAL ENCOUNTER (OUTPATIENT)
Dept: PHYSICAL THERAPY | Age: 55
Discharge: HOME | End: 2020-02-03
Payer: COMMERCIAL

## 2020-02-03 PROCEDURE — 97110 THERAPEUTIC EXERCISES: CPT | Performed by: PHYSICAL THERAPIST

## 2020-02-03 NOTE — PROGRESS NOTES
PT DAILY TREATMENT NOTE    Patient Name: Florentino Cincinnati  CECR:[id number]  : 1965  [x]  Patient  Verified  Payor: Yudy Baxter / Plan: [employer] / Product Type: HMO /    In time:11:05  Out time:11:55  Total Treatment Time (min): 50  Total Timed Codes (min): 50  Visit #: 7 of 12    Treatment Area: Low back pain [M54.5]  Neck pain [M54.2]    SUBJECTIVE  Pain Level (0-10 scale): 5  Any medication changes, allergies to medications, adverse drug reactions, diagnosis change, or new procedure performed?: [x] No    [] Yes (see summary sheet for update)  Subjective functional status/changes:   [] No changes reported  Feels good. Maybe I did too much walking over the weekend    OBJECTIVE    50 min Therapeutic Exercise:  [x] See flow sheet :   Rationale: increase ROM, increase strength and decrease pain to improve the patients ability to complete ADLs    With   [] TE   [] TA   [] neuro   [] other: Patient Education: [x] Review HEP    [] Progressed/Changed HEP based on:   [] positioning   [] body mechanics   [] transfers   [] heat/ice application    [] other:      Other Objective/Functional Measures: NA     Pain Level (0-10 scale) post treatment: 6    ASSESSMENT/Changes in Function: Patient responds well to treatment session. Patient has minimal difficulty with exercises as prescribed. Will progress as tolerated.  Patient reports disturbed sleep since original accident 3 years agoNo adverse effects were noted from today's treatment session    Patient will continue to benefit from skilled PT services to modify and progress therapeutic interventions, address functional mobility deficits, address ROM deficits, address strength deficits, analyze and address soft tissue restrictions, analyze and cue movement patterns, analyze and modify body mechanics/ergonomics, assess and modify postural abnormalities    []  See Plan of Care  []  See progress note/recertification  []  See Discharge Summary         Progress towards goals / Updated goals:  Short Term Goals: To be accomplished in 2 weeks:                   IBEUDYW will report compliance with HEP 1x/day to aid in rehabilitation program.                   Status at IE: NA                   Current: re-educated on HEP, progression                       Patient will increase lumbar ROM to 100% in all planes to aid in completion of ADLs.                  Status at IE:75% in SB                   Current:SB 75% still limited 1/22/2020     Long Term Goals: To be accomplished in 6 weeks:                   Patient will increase B LE and UE strength to 5/5 MMT throughout to aid in completion of ADLs.                  Status at IE:4/5 in hips and shoulder abduction                   Current: Same as IE                      Patient will report pain no greater than 5/10 throughout entire day to aid in completion of ADLs.                  Status at IE:5-10                   Current: 5-10, only pain when moving. Progressing 1/27/20/20                      Patent will perform 10sit<>stands with no pain to be able to complete ADLs.                  Status at [facility] upon standing                   Current: Same as IE                      Patient will improve FOTO score to 52 points to demonstrate improvement in functional status.                    Status at IE:33                   Current: Same as IE                   *FOTO score is an established functional score where 100 = no disability*    PLAN  []  Upgrade activities as tolerated     [x]  Continue plan of care  []  Update interventions per flow sheet       []  Discharge due to:_  []  Other:_      Navdeep Savage PT, DPT 2/3/2020  11:31 AM    Future Appointments   Date Time Provider Eduardo Ludwig   2/6/2020 11:00 AM Gem Hughes PT, DPT [facility] THE [facility]   2/10/2020 11:00 AM Gem Hughes PT, DPT [facility] THE [facility]   2/13/2020 11:00 AM Gem Hughes PT, DPT [facility] THE [facility]   2/17/2020 11:00 AM Gem Hughes PT, DPT [facility] THE [facility]   2/20/2020  1:30 PM Richelle Fernandez Carlton Le DPT [facility]

## 2020-02-06 ENCOUNTER — HOSPITAL ENCOUNTER (OUTPATIENT)
Dept: PHYSICAL THERAPY | Age: 55
Discharge: HOME | End: 2020-02-06
Payer: COMMERCIAL

## 2020-02-06 PROCEDURE — 97110 THERAPEUTIC EXERCISES: CPT | Performed by: PHYSICAL THERAPIST

## 2020-02-06 NOTE — PROGRESS NOTES
[facility] at [address] [place] [address] Drive: [phone]   Fax: [phone]  Progress Note  Patient Name: Estelita Townsend : 1965   Medical   Diagnosis: Low back pain [M54.5]  Neck pain [M54.2] Treatment Diagnosis: Neck pain, low back pain   Onset Date: chronic     Referral Source: Vic Washburn DO Start of Care [facility]): 1/3/2020   Prior Hospitalization: See medical history Provider #: [id number]   Prior Level of Function: Sedentary, independent w/ ADLs   Comorbidities: Left BBB   Medications: Verified on Patient Summary List      ===========================================================================================  Assessment / Summary of Care:  Estelita Townsend is a 53 y.o.  yo female with chronic low back pain and neck pain. Patient continues to elevated pain levels, but is increasing in activity tolerance. Patient will continue to benefit from skilled PT services to modify and progress therapeutic interventions, address functional mobility deficits, address ROM deficits, address strength deficits, analyze and address soft tissue restrictions, analyze and cue movement patterns, analyze and modify body mechanics/ergonomics, assess and modify postural abnormalities,    ===========================================================================================    Plan:Continue therapy per initial plan/protocol at a frequency of  2 x per week for 6 weeks    Goals:   Short Term Goals: To be accomplished in 2 weeks:                   Patient will report compliance with HEP 1x/day to aid in rehabilitation program.                   Status at IE: NA                   Current: re-educated on HEP, progression                       Patient will increase lumbar ROM to 100% in all planes to aid in completion of ADLs.                    Status at IE:75% in SB                   Current:SB 75% still limited 2020     Long Term Goals: To be accomplished in 6 weeks:                   WJCUVOB will increase B LE and UE strength to 5/5 MMT throughout to aid in completion of ADLs.                  Status at IE:4/5 in hips and shoulder abduction                   Current: 4/5 hip ext, progressing 2/6/2020                      Patient will report pain no greater than 5/10 throughout entire day to aid in completion of ADLs.                  Status at IE:5-10                   Current: 5-10, only pain when moving. Progressing 1/27/20/20                      Patent will perform 10sit<>stands with no pain to be able to complete ADLs.                  Status at [address] [place] [address] upon standing                   Current: Same as IE                      Patient will improve FOTO score to 52 points to demonstrate improvement in functional status.                  Status at IE:33                   Current: Same as IE                   *FOTO score is an established functional score where 100 = no disability*    ===========================================================================================  Subjective: Feels like the back pain hasn't not changed much yet      Objective: 4/5 hip extension     Therapist Signature: Denis Hampton PT, DPT, DANK Date: 3/7/7414   Re-Certification:  Time: 5:08 PM     [facility] [place] [facility] [place] [address] [place] [address]         Phone: [phone]   Fax: [phone]  .

## 2020-02-06 NOTE — PROGRESS NOTES
PT DAILY TREATMENT NOTE    Patient Name: Damon Call  Date:2020  : 1965  [x]  Patient  Verified  Payor: Radha Song / Plan: [employer] / Product Type: HMO /    In time:11:00  Out time:12:00  Total Treatment Time (min): 60  Total Timed Codes (min): 60  1:1 Treatment Time ( only): 60   Visit #: 8 of 12    Treatment Area: Low back pain [M54.5]  Neck pain [M54.2]    SUBJECTIVE  Pain Level (0-10 scale): 6  Any medication changes, allergies to medications, adverse drug reactions, diagnosis change, or new procedure performed?: [x] No    [] Yes (see summary sheet for update)  Subjective functional status/changes:   [] No changes reported  Feels so-so. The exercises still haven't seemed to hit my back pain    OBJECTIVE    60 min Therapeutic Exercise:  [x] See flow sheet :   Rationale: increase ROM, increase strength and decrease pain to improve the patients ability to complete ADLs    With   [] TE   [] TA   [] neuro   [] other: Patient Education: [x] Review HEP    [] Progressed/Changed HEP based on:   [] positioning   [] body mechanics   [] transfers   [] heat/ice application    [] other:      Other Objective/Functional Measures: NA     Pain Level (0-10 scale) post treatment: 6    ASSESSMENT/Changes in Function: Patient responds well to treatment session. Patient is challenged with chop/lift exercises today. . No adverse effects were noted from today's treatment session       Patient will continue to benefit from skilled PT services to modify and progress therapeutic interventions, address functional mobility deficits, address ROM deficits, address strength deficits, analyze and address soft tissue restrictions, analyze and cue movement patterns, analyze and modify body mechanics/ergonomics, assess and modify postural abnormalities,    []  See Plan of Care  []  See progress note/recertification  []  See Discharge Summary         Progress towards goals / Updated goals:  Short Term Goals: To be accomplished in 2 weeks:                   Patient will report compliance with HEP 1x/day to aid in rehabilitation program.                   Status at IE: NA                   Current: re-educated on HEP, progression                       Patient will increase lumbar ROM to 100% in all planes to aid in completion of ADLs.                  Status at IE:75% in SB                   Current:SB 75% still limited 1/22/2020     Long Term Goals: To be accomplished in 6 weeks:                   Patient will increase B LE and UE strength to 5/5 MMT throughout to aid in completion of ADLs.                  Status at IE:4/5 in hips and shoulder abduction                   Current: 4/5 hip ext, progressing 2/6/2020                      Patient will report pain no greater than 5/10 throughout entire day to aid in completion of ADLs.                  Status at IE:5-10                   Current: 5-10, only pain when moving. Progressing 1/27/20/20                      Patent will perform 10sit<>stands with no pain to be able to complete ADLs.                  Status at [address] [place] [address] upon standing                   Current: Same as IE                      Patient will improve FOTO score to 52 points to demonstrate improvement in functional status.                    Status at IE:33                   Current: Same as IE                   *FOTO score is an established functional score where 100 = no disability*    PLAN  []  Upgrade activities as tolerated     [x]  Continue plan of care  []  Update interventions per flow sheet       []  Discharge due to:_  []  Other:_      Odette Meng PT, DPT 2/6/2020  11:58 AM    Future Appointments   Date Time Provider Eduardo Ludwig   2/10/2020 11:00 AM Chiki Muhammad PT, DPT [facility]PTVJORGE THE [facility]   2/13/2020 11:00 AM Chiki Muhammad PT, DPT [facility]PT[facility] THE [facility]   2/17/2020 11:00 AM Chiki Muhammad PT, DPT [facility]PT[facility] THE [facility]   2/20/2020  1:30 PM Chiki Muhammad PT, DPT MI[facility]PT[facility] THE [facility]

## 2020-02-10 ENCOUNTER — HOSPITAL ENCOUNTER (OUTPATIENT)
Dept: PHYSICAL THERAPY | Age: 55
Discharge: HOME | End: 2020-02-10
Payer: COMMERCIAL

## 2020-02-10 PROCEDURE — 97110 THERAPEUTIC EXERCISES: CPT | Performed by: PHYSICAL THERAPIST

## 2020-02-10 NOTE — PROGRESS NOTES
PT DAILY TREATMENT NOTE    Patient Name: Omar Cordero  VIEJ:[birth year]12/[birth year]32  : 1965  [x]  Patient  Verified  Payor: Yany Chavis / Plan: [employer] / Product Type: HMO /    In time:10:55  Out time:12:03  Total Treatment Time (min): 68  Total Timed Codes (min): 68  Visit #: 9 of 24    Treatment Area: Low back pain [M54.5]  Neck pain [M54.2]    SUBJECTIVE  Pain Level (0-10 scale): 4  Any medication changes, allergies to medications, adverse drug reactions, diagnosis change, or new procedure performed?: [x] No    [] Yes (see summary sheet for update)  Subjective functional status/changes:   [] No changes reported  Feels good. Had a massage on Friday. It's painful/sore, but feels good. OBJECTIVE    68 min Therapeutic Exercise:  [x] See flow sheet :   Rationale: increase ROM, increase strength and decrease pain to improve the patients ability to complete ADLs       With   [] TE   [] TA   [] neuro   [] other: Patient Education: [x] Review HEP    [] Progressed/Changed HEP based on:   [] positioning   [] body mechanics   [] transfers   [] heat/ice application    [] other:      Other Objective/Functional Measures: NA     Pain Level (0-10 scale) post treatment: 4    ASSESSMENT/Changes in Function: Patient responds well to treatment session. Patient has less difficulty with exercises today. Appears to be progressing well. . No adverse effects were noted from today's treatment session       Patient will continue to benefit from skilled PT services to modify and progress therapeutic interventions, address functional mobility deficits, address ROM deficits, address strength deficits, analyze and address soft tissue restrictions, analyze and cue movement patterns, analyze and modify body mechanics/ergonomics, assess and modify postural abnormalities    []  See Plan of Care  []  See progress note/recertification  []  See Discharge Summary         Progress towards goals / Updated goals:  Short Term Goals: To be accomplished in 2 weeks:                   Patient will report compliance with HEP 1x/day to aid in rehabilitation program.                   Status at IE: NA                   Current: re-educated on HEP, progression                       Patient will increase lumbar ROM to 100% in all planes to aid in completion of ADLs.                  Status at IE:75% in SB                   Current:SB 75% still limited 1/22/2020     Long Term Goals: To be accomplished in 6 weeks:                   Patient will increase B LE and UE strength to 5/5 MMT throughout to aid in completion of ADLs.                  Status at IE:4/5 in hips and shoulder abduction                   Current: 4/5 hip ext, progressing 2/6/2020                      Patient will report pain no greater than 5/10 throughout entire day to aid in completion of ADLs.                  Status at IE:5-10                   Current: 5-10, only pain when moving. Progressing 1/27/20/20                      Patent will perform 10sit<>stands with no pain to be able to complete ADLs.                  Status at [address] [place] [address] upon standing                   Current: 10 sit<.>stands with some pain, progressing 2/10/2020                      Patient will improve FOTO score to 52 points to demonstrate improvement in functional status.                    Status at IE:33                   Current: Same as IE                   *FOTO score is an established functional score where 100 = no disability*    PLAN  []  Upgrade activities as tolerated     [x]  Continue plan of care  []  Update interventions per flow sheet       []  Discharge due to:_  []  Other:_      Trevor Gama PT, DPT 2/10/2020  11:13 AM    Future Appointments   Date Time Provider Eduardo Ludwig   2/13/2020 11:00 AM Brendon Martin PT, DPT [facility]   2/17/2020 11:00 AM Brendon Martin PT, DPT [facility]   2/20/2020  1:30 PM Brendon Martin PT, DPT [facility]

## 2020-02-13 ENCOUNTER — HOSPITAL ENCOUNTER (OUTPATIENT)
Dept: PHYSICAL THERAPY | Age: 55
Discharge: HOME | End: 2020-02-13
Payer: COMMERCIAL

## 2020-02-13 PROCEDURE — 97110 THERAPEUTIC EXERCISES: CPT | Performed by: PHYSICAL THERAPIST

## 2020-02-13 NOTE — PROGRESS NOTES
PT DAILY TREATMENT NOTE    Patient Name: Rowan Plan  ISFJ:1521  : 1965  [x]  Patient  Verified  Payor: Donavon Ruiz / Plan: [employer] / Product Type: HMO /    In time:11:00  Out time:12:19  Total Treatment Time (min): 79  Total Timed Codes (min): 79  Visit #: 10 of 24    Treatment Area: Low back pain [M54.5]  Neck pain [M54.2]    SUBJECTIVE  Pain Level (0-10 scale): 5  Any medication changes, allergies to medications, adverse drug reactions, diagnosis change, or new procedure performed?: [x] No    [] Yes (see summary sheet for update)  Subjective functional status/changes:   [] No changes reported  Feels okay    OBJECTIVE    79 min Therapeutic Exercise:  [x] See flow sheet :   Rationale: increase ROM, increase strength and decrease pain to improve the patients ability to complete ADLs    With   [] TE   [] TA   [] neuro   [] other: Patient Education: [x] Review HEP    [] Progressed/Changed HEP based on:   [] positioning   [] body mechanics   [] transfers   [] heat/ice application    [] other:      Other Objective/Functional Measures: NA     Pain Level (0-10 scale) post treatment: 4    ASSESSMENT/Changes in Function: Patient responds well to treatment session. . Patient does not appear to have any exacerbation with prone extension for radicular symptoms, but does appear to agitate other thoracic regions (posible lower trap region). Also no benefit from lumbar mobility exercises or repeated supine to sit activity.  Does appear to have some benefit from overall exercise program   No adverse effects were noted from today's treatment session       Patient will continue to benefit from skilled PT services to modify and progress therapeutic interventions, address functional mobility deficits, address ROM deficits, address strength deficits, analyze and address soft tissue restrictions, analyze and cue movement patterns, analyze and modify body mechanics/ergonomics, assess and modify postural abnormalities    []  See Plan of Care  []  See progress note/recertification  []  See Discharge Summary         Progress towards goals / Updated goals:  Short Term Goals: To be accomplished in 2 weeks:                   QXGJSOS will report compliance with HEP 1x/day to aid in rehabilitation program.                   Status at IE: NA                   Current: re-educated on HEP, progression                       Patient will increase lumbar ROM to 100% in all planes to aid in completion of ADLs.                  Status at IE:75% in SB                   Current:SB 75% still limited 1/22/2020     Long Term Goals: To be accomplished in 6 weeks:                   Patient will increase B LE and UE strength to 5/5 MMT throughout to aid in completion of ADLs.                  Status at IE:4/5 in hips and shoulder abduction                   Current: 4/5 hip ext, progressing 2/6/2020                      Patient will report pain no greater than 5/10 throughout entire day to aid in completion of ADLs.                  Status at IE:5-10                   Current: 5-10, only pain when moving. Progressing 1/27/20/20                      Patent will perform 10sit<>stands with no pain to be able to complete ADLs.                  Status at [address] [place] [address] upon standing                   Current: 10 sit<.>stands with some pain, progressing 2/10/2020                      Patient will improve FOTO score to 52 points to demonstrate improvement in functional status.                    Status at IE:33                   Current: Same as IE                   *FOTO score is an established functional score where 100 = no disability*    PLAN  []  Upgrade activities as tolerated     [x]  Continue plan of care  []  Update interventions per flow sheet       []  Discharge due to:_  []  Other:_      Prabhakar Sanchez PT, DPT 2/13/2020  11:12 AM    Future Appointments   Date Time Provider Eduardo Ludwig   2/17/2020 11:00 AM Britney Briceño, PT, DPT [facility]   2/20/2020  1:30 PM Brendon Martin PT, DPT [facility]   2/24/2020 10:30 AM Brendon Martin PT, GURJIT [facility]   2/27/2020 10:00 AM Brendon Martin PT, DPT [facility]   3/2/2020 10:00 AM Brendon Martin PT, DPT [facility]   3/5/2020 10:00 AM Brendon Martin PT, DPT [facility]

## 2020-02-17 ENCOUNTER — HOSPITAL ENCOUNTER (OUTPATIENT)
Dept: PHYSICAL THERAPY | Age: 55
Discharge: HOME | End: 2020-02-17
Payer: COMMERCIAL

## 2020-02-17 PROCEDURE — 97110 THERAPEUTIC EXERCISES: CPT

## 2020-02-17 NOTE — PROGRESS NOTES
PT DAILY TREATMENT NOTE - [facility]     Patient Name: Rowan Plan  CLAF:[id number]  : 1965  [x]  Patient  Verified  Payor: Donavon Ruiz / Plan: [employer]  CAPITATED PT / Product Type: Commerical /    In time:1055  Out time:1155  Total Treatment Time (min): 60  Total Timed Codes (min): 60   1:1 Treatment Time ( only): 60   Visit #: 11 of 12    Treatment Area: Low back pain [M54.5]  Neck pain [M54.2]    SUBJECTIVE  Pain Level (0-10 scale): 6 neck 9 back  Any medication changes, allergies to medications, adverse drug reactions, diagnosis change, or new procedure performed?: [x] No    [] Yes (see summary sheet for update)  Subjective functional status/changes:   [] No changes reported  Patient reports that she stopped taking her baclofen last week and her pain has gotten worse. She states that she also is not sleeping well secondary to pain. OBJECTIVE      60 min Therapeutic Exercise:  [x] See flow sheet :   Rationale: increase ROM, increase strength and decrease pain to improve the patients ability to complete ADLs          With   [x] TE   [] TA   [] neuro   [] other: Patient Education: [x] Review HEP    [] Progressed/Changed HEP based on:   [] positioning   [] body mechanics   [] transfers   [] heat/ice application    [] other:      Other Objective/Functional Measures: NA     Pain Level (0-10 scale) post treatment: 6    ASSESSMENT/Changes in Function: Patient responds well to treatment session. Educated patient that she should consult with her physician regarding medication adjustments before quitting medication. Also advised patient to discuss sleep dysfunction with her physician. She agreed. She demonstrated fair tolerance to exercise as she reported a weakness in her legs with exercise.  No adverse effects were noted from today's treatment session    Patient will continue to benefit from skilled PT services to modify and progress therapeutic interventions, address functional mobility deficits, address ROM deficits, address strength deficits, analyze and address soft tissue restrictions, analyze and cue movement patterns, analyze and modify body mechanics/ergonomics, assess and modify postural abnormalities, instruct in home and community integration to attain remaining goals. []  See Plan of Care  []  See progress note/recertification  []  See Discharge Summary         Progress towards goals / Updated goals:  Short Term Goals: To be accomplished in 2 weeks:                   Patient will report compliance with HEP 1x/day to aid in rehabilitation program.                   Status at IE: NA                   Current: re-educated on HEP, progression                       Patient will increase lumbar ROM to 100% in all planes to aid in completion of ADLs.                  Status at IE:75% in SB                   Current:SB 75% still limited 2/17/2020     Long Term Goals: To be accomplished in 6 weeks:                   Patient will increase B LE and UE strength to 5/5 MMT throughout to aid in completion of ADLs.                  Status at IE:4/5 in hips and shoulder abduction                   Current: 4/5 hip ext, progressing 2/6/2020                      Patient will report pain no greater than 5/10 throughout entire day to aid in completion of ADLs.                  Status at IE:5-10                   Current: 5-10, only pain when moving. Progressing 1/27/20/20                      Patent will perform 10sit<>stands with no pain to be able to complete ADLs.                  Status at [facility] upon standing                   Current: 10 sit<.>stands with some pain, progressing 2/10/2020                      Patient will improve FOTO score to 52 points to demonstrate improvement in functional status.                    Status at IE:33                   Current: Same as IE                   *FOTO score is an established functional score where 100 = no disability*    PLAN  []  Upgrade activities as tolerated [x]  Continue plan of care  []  Update interventions per flow sheet       []  Discharge due to:_  []  Other:_      Angelica Garland PT, DPT 2/17/2020  12:32 PM    Future Appointments   Date Time Provider Eduardo Ludwig   2/20/2020  1:30 PM Dave Carreon, DPT [facility]   2/24/2020 10:30 AM Alphonso Prajapati PT, DPT [facility]   2/27/2020 10:00 AM Alphonso Prajapait PT, DPT [facility]   3/2/2020 10:00 AM Alphonso Prajapati PT, DPT [facility]   3/5/2020 10:00 AM Alphonso Prajapati PT, DPT [facility]

## 2020-02-20 ENCOUNTER — APPOINTMENT (OUTPATIENT)
Dept: PHYSICAL THERAPY | Age: 55
End: 2020-02-20
Payer: COMMERCIAL

## 2020-02-21 ENCOUNTER — HOSPITAL ENCOUNTER (OUTPATIENT)
Dept: PHYSICAL THERAPY | Age: 55
Discharge: HOME | End: 2020-02-21
Payer: COMMERCIAL

## 2020-02-21 PROCEDURE — 97110 THERAPEUTIC EXERCISES: CPT | Performed by: PHYSICAL THERAPIST

## 2020-02-21 NOTE — PROGRESS NOTES
PT DAILY TREATMENT NOTE    Patient Name: Guy Bucio  BFUY:  : 1965  [x]  Patient  Verified  Payor: Shira Lao / Plan[facility] [employer]  [facility] Product Type: Commerical /    In time: 2:55  Out time:3:59  Total Treatment Time (min): 64  Total Timed Codes (min): 64  Visit #: 12 of 24    Treatment Area: Low back pain [M54.5]  Neck pain [M54.2]    SUBJECTIVE  Pain Level (0-10 scale): 7  Any medication changes, allergies to medications, adverse drug reactions, diagnosis change, or new procedure performed?: [x] No    [] Yes (see summary sheet for update)  Subjective functional status/changes:   [] No changes reported  Feels about the same. Is thinking about going back to a spine/neuro surgeon for second opinion    OBJECTIVE    64 min Therapeutic Exercise:  [x] See flow sheet :   Rationale: increase ROM, increase strength and decrease pain to improve the patients ability to complete ADLs       With   [] TE   [] TA   [] neuro   [] other: Patient Education: [x] Review HEP    [] Progressed/Changed HEP based on:   [] positioning   [] body mechanics   [] transfers   [] heat/ice application    [] other:      Other Objective/Functional Measures: NA     Pain Level (0-10 scale) post treatment: 6    ASSESSMENT/Changes in Function: Patient responds well to treatment session. Patient continues to have pain. Was provided with several more exercises today. Will f/u with response on Monday.  No adverse effects were noted from today's treatment session    Patient will continue to benefit from skilled PT services to modify and progress therapeutic interventions, address functional mobility deficits, address ROM deficits, address strength deficits, analyze and address soft tissue restrictions, analyze and cue movement patterns, analyze and modify body mechanics/ergonomics, assess and modify postural abnormalities      []  See Plan of Care  []  See progress note/recertification  []  See Discharge Summary         Progress towards goals / Updated goals:  Short Term Goals: To be accomplished in 2 weeks:                   Patient will report compliance with HEP 1x/day to aid in rehabilitation program.                   Status at IE: NA                   Current: re-educated on HEP, progression                       Patient will increase lumbar ROM to 100% in all planes to aid in completion of ADLs.                  Status at IE:75% in SB                   Current:SB 75% still limited 2/17/2020     Long Term Goals: To be accomplished in 6 weeks:                   Patient will increase B LE and UE strength to 5/5 MMT throughout to aid in completion of ADLs.                  Status at IE:4/5 in hips and shoulder abduction                   Current: 4/5 hip ext, progressing 2/6/2020                      Patient will report pain no greater than 5/10 throughout entire day to aid in completion of ADLs.                  Status at IE:5-10                   Current: 5-10, only pain when moving. Progressing 1/27/20/20                      Patent will perform 10sit<>stands with no pain to be able to complete ADLs.                  Status at [address] [place] [address] upon standing                   Current: 10 sit<.>stands with some pain, progressing 2/10/2020                      Patient will improve FOTO score to 52 points to demonstrate improvement in functional status.                    Status at IE:33                   Current: Same as IE                   *FOTO score is an established functional score where 100 = no disability*    PLAN  []  Upgrade activities as tolerated     [x]  Continue plan of care  []  Update interventions per flow sheet       []  Discharge due to:_  []  Other:_      Ofe Robbins PT, DPT 2/21/2020  3:14 PM    Future Appointments   Date Time Provider Eduardo Ludwig   2/24/2020 10:30 AM Alice Lugo, PT, DPT [facility]   2/27/2020 10:00 AM Alice Lugo, PT, DPT [facility]   3/2/2020 10:00 AM Alice Lugo, PT, DPT [facility] 3/5/2020 10:00 AM Britney Briceño PT, DPT [facility]

## 2020-02-24 ENCOUNTER — HOSPITAL ENCOUNTER (OUTPATIENT)
Dept: PHYSICAL THERAPY | Age: 55
Discharge: HOME | End: 2020-02-24
Payer: COMMERCIAL

## 2020-02-24 PROCEDURE — 97110 THERAPEUTIC EXERCISES: CPT | Performed by: PHYSICAL THERAPIST

## 2020-02-24 NOTE — PROGRESS NOTES
PT DAILY TREATMENT NOTE    Patient Name: Damon Call  Date:2020  : 1965  [x]  Patient  Verified  Payor: Radha Song / Plan: [employer]  CAPIT[employer] PT / Product Type: Commerical /    In time:10:30  Out time:11:37  Total Treatment Time (min): 67  Total Timed Codes (min): 67  Visit #: 13 of 24    Treatment Area: Low back pain [M54.5]  Neck pain [M54.2]    SUBJECTIVE  Pain Level (0-10 scale): 7  Any medication changes, allergies to medications, adverse drug reactions, diagnosis change, or new procedure performed?: [x] No    [] Yes (see summary sheet for update)  Subjective functional status/changes:   [] No changes reported  Feels a little sore today. OBJECTIVE    67 min Therapeutic Exercise:  [x] See flow sheet :   Rationale: increase ROM, increase strength and decrease pain to improve the patients ability to complete ADLs    With   [] TE   [] TA   [] neuro   [] other: Patient Education: [x] Review HEP    [] Progressed/Changed HEP based on:   [] positioning   [] body mechanics   [] transfers   [] heat/ice application    [] other:      Other Objective/Functional Measures: NA     Pain Level (0-10 scale) post treatment: 7    ASSESSMENT/Changes in Function: Patient responds well to treatment session. Patient is challenged with deadlift exercises. Will attempt repeated flexion on next visit (I.e. more direct, cognitive functional therapy approach). . No adverse effects were noted from today's treatment session    Patient will continue to benefit from skilled PT services to modify and progress therapeutic interventions, address functional mobility deficits, address ROM deficits, address strength deficits, analyze and address soft tissue restrictions, analyze and cue movement patterns, analyze and modify body mechanics/ergonomics, assess and modify postural abnormalitie    []  See Plan of Care  []  See progress note/recertification  []  See Discharge Summary         Progress towards goals / Updated goals:  Short Term Goals: To be accomplished in 2 weeks:                   Patient will report compliance with HEP 1x/day to aid in rehabilitation program.                   Status at IE: NA                   Current: re-educated on HEP, progression                       Patient will increase lumbar ROM to 100% in all planes to aid in completion of ADLs.                  Status at IE:75% in SB                   Current:SB 75% still limited 2/17/2020     Long Term Goals: To be accomplished in 6 weeks:                   Patient will increase B LE and UE strength to 5/5 MMT throughout to aid in completion of ADLs.                  Status at IE:4/5 in hips and shoulder abduction                   Current: 4/5 hip ext, progressing 2/6/2020                      Patient will report pain no greater than 5/10 throughout entire day to aid in completion of ADLs.                  Status at IE:5-10                   Current: 5-10, only pain when moving. Progressing 2/24/20/20                      Patent will perform 10sit<>stands with no pain to be able to complete ADLs.                  Status at [address] [place] [address] upon standing                   Current: 10 sit<.>stands with some pain, progressing 2/10/2020                      Patient will improve FOTO score to 52 points to demonstrate improvement in functional status.                    Status at IE:33                   Current: Same as IE                   *FOTO score is an established functional score where 100 = no disability*    PLAN  []  Upgrade activities as tolerated     [x]  Continue plan of care  []  Update interventions per flow sheet       []  Discharge due to:_  []  Other:_      Ge Gan PT, DPT 2/24/2020  10:40 AM    Future Appointments   Date Time Provider Eduardo Ludwig   2/27/2020 10:00 AM Marcelo White PT, DPT [facility]   3/2/2020 10:00 AM Marcelo White PT, DPT [facility]   3/5/2020 10:00 AM Marcelo White PT, DPT [facility]

## 2020-02-27 ENCOUNTER — HOSPITAL ENCOUNTER (OUTPATIENT)
Dept: PHYSICAL THERAPY | Age: 55
Discharge: HOME | End: 2020-02-27
Payer: COMMERCIAL

## 2020-02-27 PROCEDURE — 97110 THERAPEUTIC EXERCISES: CPT | Performed by: PHYSICAL THERAPIST

## 2020-02-27 NOTE — PROGRESS NOTES
PT DAILY TREATMENT NOTE    Patient Name: Estelita Townsend  Date:2020  : 1965  [x]  Patient  Verified  Payor: Adonis Bates / Plan: [place]  [facility] / Product Type: Commerical /    In time:10:00  Out time:11:00  Total Treatment Time (min): 60  Total Timed Codes (min): 60  Visit #: 14 of 24    Treatment Area: Low back pain [M54.5]  Neck pain [M54.2]    SUBJECTIVE  Pain Level (0-10 scale): 6  Any medication changes, allergies to medications, adverse drug reactions, diagnosis change, or new procedure performed?: [x] No    [] Yes (see summary sheet for update)  Subjective functional status/changes:   [] No changes reported  Feels alright. Legs were sore after last viist    OBJECTIVE    60 min Therapeutic Exercise:  [x] See flow sheet :   Rationale: increase ROM, increase strength and decrease pain to improve the patients ability to complete ADLs       With   [] TE   [] TA   [] neuro   [] other: Patient Education: [x] Review HEP    [] Progressed/Changed HEP based on:   [] positioning   [] body mechanics   [] transfers   [] heat/ice application    [] other:      Other Objective/Functional Measures: NA     Pain Level (0-10 scale) post treatment: 6    ASSESSMENT/Changes in Function: Patient responds well to treatment session. Patient is progressing well with deadlift exercises. Will progress as tolerated.  No adverse effects were noted from today's treatment session       Patient will continue to benefit from skilled PT services to modify and progress therapeutic interventions, address functional mobility deficits, address ROM deficits, address strength deficits, analyze and address soft tissue restrictions, analyze and cue movement patterns, analyze and modify body mechanics/ergonomics, assess and modify postural abnormalities    []  See Plan of Care  []  See progress note/recertification  []  See Discharge Summary         Progress towards goals / Updated goals:  Short Term Goals: To be accomplished in 2 weeks:                   PDVVEWD will report compliance with HEP 1x/day to aid in rehabilitation program.                   Status at IE: NA                   Current: re-educated on HEP, progression                       Patient will increase lumbar ROM to 100% in all planes to aid in completion of ADLs.                  Status at IE:75% in SB                   Current:SB 75% still limited 2/17/2020     Long Term Goals: To be accomplished in 6 weeks:                   Patient will increase B LE and UE strength to 5/5 MMT throughout to aid in completion of ADLs.                  Status at IE:4/5 in hips and shoulder abduction                   Current: 4/5 hip ext, progressing 2/6/2020                      Patient will report pain no greater than 5/10 throughout entire day to aid in completion of ADLs.                  Status at IE:5-10                   Current: 5-10, only pain when moving. Progressing 2/24/20/20                      Patent will perform 10sit<>stands with no pain to be able to complete ADLs.                  Status at [address] [place] [address] upon standing                   Current: 10 sit<.>stands with some pain, progressing 2/10/2020                      Patient will improve FOTO score to 52 points to demonstrate improvement in functional status.                    Status at IE:33                   Current: Same as IE                   *FOTO score is an established functional score where 100 = no disability*    PLAN  []  Upgrade activities as tolerated     [x]  Continue plan of care  []  Update interventions per flow sheet       []  Discharge due to:_  []  Other:_      Rashmi Wing PT, DPT 2/27/2020  10:23 AM    Future Appointments   Date Time Provider Eduardo Ludwig   3/2/2020 10:00 AM Mp Zayas PT, DPT [facility]   3/5/2020 10:00 AM Mp Zayas PT, DPT L[facility]

## 2020-03-02 ENCOUNTER — HOSPITAL ENCOUNTER (OUTPATIENT)
Dept: PHYSICAL THERAPY | Age: 55
Discharge: HOME | End: 2020-03-02
Payer: COMMERCIAL

## 2020-03-02 PROCEDURE — 97110 THERAPEUTIC EXERCISES: CPT

## 2020-03-02 NOTE — PROGRESS NOTES
PT DAILY TREATMENT NOTE -  Patient Name: Pablo Mcdonough  Date:3/2/2020  : 1965  [x]  Patient  Verified  Payor: Tate Single / Plan: [place] [employer] H[employer] / Product Type: HMO /    In time:1005  Out time:1040  Total Treatment Time (min): 35  Total Timed Codes (min): 35   Visit #: 15 of 24    Treatment Area: Low back pain [M54.5]  Neck pain [M54.2]    SUBJECTIVE  Pain Level (0-10 scale): 10 back  Any medication changes, allergies to medications, adverse drug reactions, diagnosis change, or new procedure performed?: [x] No    [] Yes (see summary sheet for update)  Subjective functional status/changes:   [] No changes reported  Patient reports that she has increased pain in low back that is more to the right today. OBJECTIVE    35 min Therapeutic Exercise:  [x] See flow sheet :   Rationale: increase ROM, increase strength and decrease pain to improve the patients ability to complete ADLs          With   [x] TE   [] TA   [] neuro   [] other: Patient Education: [x] Review HEP    [] Progressed/Changed HEP based on:   [] positioning   [] body mechanics   [] transfers   [] heat/ice application    [] other:      Other Objective/Functional Measures: NA     Pain Level (0-10 scale) post treatment: 10    ASSESSMENT/Changes in Function: Patient responds fairly to treatment session as she had temporary decrease in low back pain with hip extension isometric. Symptoms were persistent throughout treatment limiting patient's ability to perform exercise activities. No adverse effects were noted from today's treatment session.     Patient will continue to benefit from skilled PT services to modify and progress therapeutic interventions, address functional mobility deficits, address ROM deficits, address strength deficits, analyze and address soft tissue restrictions, analyze and cue movement patterns, analyze and modify body mechanics/ergonomics, assess and modify postural abnormalities, instruct in home and community integration to attain remaining goals. []  See Plan of Care  []  See progress note/recertification  []  See Discharge Summary         Progress towards goals / Updated goals:  Short Term Goals: To be accomplished in 2 weeks:                   Patient will report compliance with HEP 1x/day to aid in rehabilitation program.                   Status at IE: NA                   Current: re-educated on HEP, progression                       Patient will increase lumbar ROM to 100% in all planes to aid in completion of ADLs.                  Status at IE:75% in SB                   Current:SB 75% still limited 2/17/2020     Long Term Goals: To be accomplished in 6 weeks:                   Patient will increase B LE and UE strength to 5/5 MMT throughout to aid in completion of ADLs.                  Status at IE:4/5 in hips and shoulder abduction                   Current: 4/5 hip ext, progressing 2/6/2020                      Patient will report pain no greater than 5/10 throughout entire day to aid in completion of ADLs.                  Status at IE:5-10                   Current: 5-10, only pain when moving. Progressing 2/24/20/20                      Patent will perform 10sit<>stands with no pain to be able to complete ADLs.                  Status at [address] [place] [address] upon standing                   Current: 10 sit<.>stands with some pain, progressing 2/10/2020                      Patient will improve FOTO score to 52 points to demonstrate improvement in functional status.                    Status at IE:33                   Current: Same as IE                   *FOTO score is an established functional score where 100 = no disability*    PLAN  []  Upgrade activities as tolerated     [x]  Continue plan of care  []  Update interventions per flow sheet       []  Discharge due to:_  []  Other:_      Corbin Luke, PT, DPT 3/2/2020  10:25 AM    Future Appointments   Date Time Provider Eduardo Ludwig   3/5/2020 10:00 AM Poncho Andres [facility]

## 2020-03-05 ENCOUNTER — HOSPITAL ENCOUNTER (OUTPATIENT)
Dept: PHYSICAL THERAPY | Age: 55
Discharge: HOME | End: 2020-03-05
Payer: COMMERCIAL

## 2020-03-05 PROCEDURE — 97110 THERAPEUTIC EXERCISES: CPT | Performed by: PHYSICAL THERAPIST

## 2020-03-05 NOTE — PROGRESS NOTES
PT DAILY TREATMENT NOTE    Patient Name: Carolina Pineda  Date:3/5/2020  : 1965  [x]  Patient  Verified  Payor: Vik Patel / Plan: [place] [employer] / Product Type: HMO /    In time:10:00  Out time:11:04  Total Treatment Time (min): 64  Total Timed Codes (min): 64  Visit #: 16 of 21    Treatment Area: Low back pain [M54.5]  Neck pain [M54.2]    SUBJECTIVE  Pain Level (0-10 scale): 6  Any medication changes, allergies to medications, adverse drug reactions, diagnosis change, or new procedure performed?: [x] No    [] Yes (see summary sheet for update)  Subjective functional status/changes:   [] No changes reported  Feels better than the other day. The hip exercise helped    OBJECTIVE    64 min Therapeutic Exercise:  [x] See flow sheet :   Rationale: increase ROM, increase strength and decrease pain to improve the patients ability to complete ADLs       With   [] TE   [] TA   [] neuro   [] other: Patient Education: [x] Review HEP    [] Progressed/Changed HEP based on:   [] positioning   [] body mechanics   [] transfers   [] heat/ice application    [] other:      Other Objective/Functional Measures: NA     Pain Level (0-10 scale) post treatment: 4    ASSESSMENT/Changes in Function: Patient responds well to treatment session. Patient may have difficulty recruiting hip musculature due to hip joint pain. Has positive painfiul scour test in both hips. Instructed patient to attempt to desensitize these areas with light stretching movements. She was receptive. Will progress as tolerated.  No adverse effects were noted from today's treatment session       Patient will continue to benefit from skilled PT services to modify and progress therapeutic interventions, address functional mobility deficits, address ROM deficits, address strength deficits, analyze and address soft tissue restrictions, analyze and cue movement patterns, analyze and modify body mechanics/ergonomics, assess and modify postural abnormalities    []  See Plan of Care  []  See progress note/recertification  []  See Discharge Summary         Progress towards goals / Updated goals:  Short Term Goals: To be accomplished in 2 weeks:                   PHXVYGE will report compliance with HEP 1x/day to aid in rehabilitation program.                   Status at IE: NA                   Current: re-educated on HEP, progression                       Patient will increase lumbar ROM to 100% in all planes to aid in completion of ADLs.                  Status at IE:75% in SB                   Current:SB 75% still limited 2/17/2020     Long Term Goals: To be accomplished in 6 weeks:                   Patient will increase B LE and UE strength to 5/5 MMT throughout to aid in completion of ADLs.                  Status at IE:4/5 in hips and shoulder abduction                   Current: 4+/5 hip ext, progressing 3/5/2020                      Patient will report pain no greater than 5/10 throughout entire day to aid in completion of ADLs.                  Status at IE:5-10                   Current: 5-10, only pain when moving. Progressing 2/24/20/20                      Patent will perform 10sit<>stands with no pain to be able to complete ADLs.                  Status at [address] [place] [address] upon standing                   Current: 10 sit<.>stands with some pain, progressing 2/10/2020                      Patient will improve FOTO score to 52 points to demonstrate improvement in functional status.                    Status at IE:33                   Current: Same as IE                   *FOTO score is an established functional score where 100 = no disability*    PLAN  []  Upgrade activities as tolerated     [x]  Continue plan of care  []  Update interventions per flow sheet       []  Discharge due to:_  []  Other:_      Micah Duenas, PT, DPT 3/5/2020  9:56 AM    Future Appointments   Date Time Provider Eduardo Ludwig   3/5/2020 10:00 AM Eric Aceves, PT, DPT [facility]

## 2020-03-09 ENCOUNTER — HOSPITAL ENCOUNTER (OUTPATIENT)
Dept: PHYSICAL THERAPY | Age: 55
Discharge: HOME | End: 2020-03-09
Payer: COMMERCIAL

## 2020-03-09 PROCEDURE — 97110 THERAPEUTIC EXERCISES: CPT

## 2020-03-09 NOTE — PROGRESS NOTES
PT DAILY TREATMENT NOTE     Patient Name: Ale Rodriguez  Date:3/9/2020  : 1965  [x]  Patient  Verified  Payor: Lila Screws / Plan: [place] [employer] / Product Type: HMO /    In time:930  Out time:1015  Total Treatment Time (min): 45  Total Timed Codes (min): 45   Visit #: 17 of 21    Treatment Area: Low back pain [M54.5]  Neck pain [M54.2]    SUBJECTIVE  Pain Level (0-10 scale): 5 back 3 neck  Any medication changes, allergies to medications, adverse drug reactions, diagnosis change, or new procedure performed?: [x] No    [] Yes (see summary sheet for update)  Subjective functional status/changes:   [] No changes reported    Patient reports that she was very sore after last visit but feels much better today. OBJECTIVE    45 min Therapeutic Exercise:  [x] See flow sheet :   Rationale: increase ROM, increase strength and decrease pain to improve the patients ability to complete ADLs         With   [x] TE   [] TA   [] neuro   [] other: Patient Education: [x] Review HEP    [] Progressed/Changed HEP based on:   [] positioning   [] body mechanics   [] transfers   [] heat/ice application    [] other:      Other Objective/Functional Measures:   FOTO 53   Lumbar %  MMT 4+/5    Pain Level (0-10 scale) post treatment: 3 back 3 neck    ASSESSMENT/Changes in Function: Patient responds well to treatment session. No adverse effects were noted from today's treatment session. Patient is improving as she has met 2/2 STGs and 1/4 LTGs. She is developing strength and functional mobility. Patient is still limited by low back and neck pain. Will advance exercise in future visit to promote transition to independent fitness.  Patient will continue to benefit from skilled PT services to modify and progress therapeutic interventions, address functional mobility deficits, address ROM deficits, address strength deficits, analyze and address soft tissue restrictions, analyze and cue movement patterns, analyze and modify body mechanics/ergonomics, assess and modify postural abnormalities, instruct in home and community integration to attain remaining goals. []  See Plan of Care  []  See progress note/recertification  []  See Discharge Summary         Progress towards goals / Updated goals:  Short Term Goals: To be accomplished in 2 weeks:                   LTHNVFA will report compliance with HEP 1x/day to aid in rehabilitation program.                   Status at IE: NA                   Current: Met 3/9/2020                      Patient will increase lumbar ROM to 100% in all planes to aid in completion of ADLs.                  Status at IE:75% in SB                   Current: Met 100% 3/9/2020     Long Term Goals: To be accomplished in 6 weeks:                   Patient will increase B LE and UE strength to 5/5 MMT throughout to aid in completion of ADLs.                  Status at IE:4/5 in hips and shoulder abduction                   Current: 4+/5 hip ext, progressing 3/5/2020                      Patient will report pain no greater than 5/10 throughout entire day to aid in completion of ADLs.                  Status at IE:5-10                   Current: 5-10, only pain when moving. Progressing 2/24/20/20                      Patent will perform 10sit<>stands with no pain to be able to complete ADLs.                  Status at [address] [place] [address] upon standing                   Current: In-progress, 2 x 10 sit to stands with 20#                    Patient will improve FOTO score to 52 points to demonstrate improvement in functional status.                    Status at IE:33                   Current:  Met 53 3/9/2020                   *FOTO score is an established functional score where 100 = no disability*     PLAN  []  Upgrade activities as tolerated     [x]  Continue plan of care  []  Update interventions per flow sheet       []  Discharge due to:_  []  Other:_      Sylvie Chi, PT, DPT 3/9/2020  9:55 AM    Future Appointments Date Time Provider Eduardo Vani   3/12/2020  9:30 AM Randy Stage, PT [facility]   3/16/2020  9:30 AM Randy Stage, PT [facility]   3/19/2020  9:30 AM Randy Stage, PT [facility]   3/24/2020  9:30 AM Randy Stage, PT [facility]   3/26/2020  9:30 AM Randy Stage, PT [facility]   3/30/2020 10:30 AM Roberto Weinstein, PT, DPT [facility]

## 2020-03-09 NOTE — PROGRESS NOTES
[facility] at [address] [place] [address] Drive: [phone]   Fax: [phone]  Progress Note  Patient Name: Nabeel Barney : 1965   Medical   Diagnosis: Low back pain [M54.5]  Neck pain [M54.2] Treatment Diagnosis: Neck Pain  Low Back Pain   Onset Date: Chronic     Referral Source: Rehan Lala, DO Start of Care [facility]): 1/3/2020   Prior Hospitalization: See medical history Provider #: [id number]   Prior Level of Function: Sedentary, independent w/ ADLs   Comorbidities: Left BBB   Medications: Verified on Patient Summary List      ===========================================================================================  Assessment / Summary of Care:  Nabeel Barney is a 53 y.o.  female with chronic low back pain and neck pain. Patient is improving as she has met 2/2 STGs and 1/4 LTGs. She is developing strength and functional mobility. Patient is still limited by low back and neck pain. Will advance exercise in future visit to promote transition to independent fitness.  Patient will continue to benefit from skilled PT services to modify and progress therapeutic interventions, address functional mobility deficits, address ROM deficits, address strength deficits, analyze and address soft tissue restrictions, analyze and cue movement patterns, analyze and modify body mechanics/ergonomics, assess and modify postural abnormalities, instruct in home and community integration to attain remaining goals.     ===========================================================================================    Plan:Continue therapy per initial plan/protocol at a frequency of  2 x per week for 4 weeks    Goals:     Short Term Goals: To be accomplished in 2 weeks:                   Patient will report compliance with HEP 1x/day to aid in rehabilitation program.                   Status at IE: NA                   Current: Met 3/9/2020                      Patient will increase lumbar ROM to 100% in all planes to aid in completion of ADLs.                  Status at IE:75% in SB                   Current: Met 100% 3/9/2020     Long Term Goals: To be accomplished in 6 weeks:                   Patient will increase B LE and UE strength to 5/5 MMT throughout to aid in completion of ADLs.                  Status at IE:4/5 in hips and shoulder abduction                   Current: 4+/5 hip ext, progressing 3/5/2020                      Patient will report pain no greater than 5/10 throughout entire day to aid in completion of ADLs.                  Status at IE:5-10                   Current: 5-10, only pain when moving. Progressing 2/24/20/20                      Patent will perform 10sit<>stands with no pain to be able to complete ADLs.                  Status at [facility] upon standing                   Current: In-progress, 2 x 10 sit to stands with 20#                    Patient will improve FOTO score to 52 points to demonstrate improvement in functional status.                  Status at IE:33                   Current:  Met 53 3/9/2020                   *FOTO score is an established functional score where 100 = no disability*    ===========================================================================================  Subjective:   Patient reports that she was very sore after last visit but feels much better today. Objective:   FOTO 53           Lumbar %  MMT 4+/5    Therapist Signature: Barbara Low PT, DPT Date: 3/7/9584   Re-Certification:  Time: 61:88 AM       I certify that the above Therapy Services are being furnished while the patient is under my care. I agree with the treatment plan and certify that this therapy is necessary. [] I have read the above and request that my patient continue as recommended.   [] I have read the above report and request that my patient continue therapy with the following changes/special instructions: ______________________________________  [] I have read the above report and request that my patient be discharged from therapy    Physician's Signature:____________Date:_________TIME:________    ** Signature, Date and Time must be completed for valid certification **    [facility] at [address]   Phone: [phone]   Fax: [phone]

## 2020-03-12 ENCOUNTER — APPOINTMENT (OUTPATIENT)
Dept: PHYSICAL THERAPY | Age: 55
End: 2020-03-12
Payer: COMMERCIAL

## 2020-03-16 ENCOUNTER — HOSPITAL ENCOUNTER (OUTPATIENT)
Dept: PHYSICAL THERAPY | Age: 55
Discharge: HOME | End: 2020-03-16
Payer: COMMERCIAL

## 2020-03-16 PROCEDURE — 97110 THERAPEUTIC EXERCISES: CPT

## 2020-03-16 NOTE — PROGRESS NOTES
PT DAILY TREATMENT NOTE     Patient Name: Dionne Awan  Date:3/16/2020  : 1965  [x]  Patient  Verified  Payor: Noemi Pappas / Plan: [employer] / Product Type: HMO /    In JHJW:[id number]  Out time:1015  Total Treatment Time (min): 50  Total Timed Codes (min): 50    Visit #: 17 of 21    Treatment Area: Low back pain [M54.5]  Neck pain [M54.2]    SUBJECTIVE  Pain Level (0-10 scale): 5 back 4 neck  Any medication changes, allergies to medications, adverse drug reactions, diagnosis change, or new procedure performed?: [x] No    [] Yes (see summary sheet for update)  Subjective functional status/changes:   [] No changes reported    Patient reports that her back is hurting today. She states that the pain is not as severe as previous visits but still present. OBJECTIVE    50 min Therapeutic Exercise:  [x] See flow sheet :   Rationale: increase ROM, increase strength and decrease pain to improve the patients ability to complete ADLs          With   [x] TE   [] TA   [] neuro   [] other: Patient Education: [x] Review HEP    [] Progressed/Changed HEP based on:   [] positioning   [] body mechanics   [] transfers   [] heat/ice application    [] other:      Other Objective/Functional Measures: NA     Pain Level (0-10 scale) post treatment: 4    ASSESSMENT/Changes in Function: Patient responds well to treatment session as she had a reduction in symptoms with forward flexion. Reviewed hip extension isometric for HEP. No adverse effects were noted from today's treatment session    Patient will continue to benefit from skilled PT services to modify and progress therapeutic interventions, address functional mobility deficits, address ROM deficits, address strength deficits, analyze and address soft tissue restrictions, analyze and cue movement patterns, analyze and modify body mechanics/ergonomics, assess and modify postural abnormalities, instruct in home and community integration to attain remaining goals.       []  See Plan of Care  []  See progress note/recertification  []  See Discharge Summary         Progress towards goals / Updated goals:  Short Term Goals: To be accomplished in 2 weeks:                   PSRBLDO will report compliance with HEP 1x/day to aid in rehabilitation program.                   Status at IE: NA                   Current: Met 3/9/2020                      Patient will increase lumbar ROM to 100% in all planes to aid in completion of ADLs.                  Status at IE:75% in SB                   Current: Met 100% 3/9/2020     Long Term Goals: To be accomplished in 6 weeks:                   Patient will increase B LE and UE strength to 5/5 MMT throughout to aid in completion of ADLs.                  Status at IE:4/5 in hips and shoulder abduction                   Current: 4+/5 hip ext, progressing 3/5/2020                      Patient will report pain no greater than 5/10 throughout entire day to aid in completion of ADLs.                  Status at IE:5-10                   Current: 5-10, only pain when moving. Progressing 2/24/20/20                      Patent will perform 10sit<>stands with no pain to be able to complete ADLs.                  Status at [address] [place] [address] upon standing                   Current:  In-progress, 2 x 10 sit to stands with 20#                    Patient will improve FOTO score to 52 points to demonstrate improvement in functional status.                    Status at IE:33                   Current:  Met 53 3/9/2020                   *FOTO score is an established functional score where 100 = no disability*    PLAN  []  Upgrade activities as tolerated     [x]  Continue plan of care  []  Update interventions per flow sheet       []  Discharge due to:_  []  Other:_      Samuel Pope PT, DPT 3/16/2020  10:00 AM    Future Appointments   Date Time Provider Eduardo Ludwig   3/19/2020  9:30 AM LILIYA Ardon [facility]   3/24/2020  9:30 AM LILIYA Ardon [facility] 3/26/2020  9:30 AM Susy Rich PT [facility]   3/30/2020 10:30 AM Marcelo White PT, DPT [facility]

## 2020-03-19 ENCOUNTER — HOSPITAL ENCOUNTER (OUTPATIENT)
Dept: PHYSICAL THERAPY | Age: 55
Discharge: HOME | End: 2020-03-19
Payer: COMMERCIAL

## 2020-03-19 PROCEDURE — 97110 THERAPEUTIC EXERCISES: CPT

## 2020-03-19 NOTE — PROGRESS NOTES
PT DAILY TREATMENT NOTE - [facility]     Patient Name: Guy Bucio  Date:3/19/2020  : 1965  [x]  Patient  Verified  Payor: Shira Lao / Plan: [employer] / Product Type: HMO /    In [id number]  Out time:1030  Total Treatment Time (min): 55  Total Timed Codes (min): 55   1:1 Treatment Time ( only): 54   Visit #: 18 of 21    Treatment Area: Low back pain [M54.5]  Neck pain [M54.2]    SUBJECTIVE  Pain Level (0-10 scale): 6 back 3 neck  Any medication changes, allergies to medications, adverse drug reactions, diagnosis change, or new procedure performed?: [x] No    [] Yes (see summary sheet for update)  Subjective functional status/changes:   [] No changes reported    Patient reports that her back is sore today. OBJECTIVE    55 min Therapeutic Exercise:  [x] See flow sheet :   Rationale: increase ROM, increase strength and decrease pain to improve the patients ability to complete ADLs          With   [x] TE   [] TA   [] neuro   [] other: Patient Education: [x] Review HEP    [] Progressed/Changed HEP based on:   [] positioning   [] body mechanics   [] transfers   [] heat/ice application    [] other:      Other Objective/Functional Measures: NA     Pain Level (0-10 scale) post treatment: 5    ASSESSMENT/Changes in Function: Patient responds well to treatment session. Patient required cues to perform hip hinge with proper mechanics. No adverse effects were noted from today's treatment session    Patient will continue to benefit from skilled PT services to modify and progress therapeutic interventions, address functional mobility deficits, address ROM deficits, address strength deficits, analyze and address soft tissue restrictions, analyze and cue movement patterns, analyze and modify body mechanics/ergonomics, assess and modify postural abnormalities, instruct in home and community integration to attain remaining goals.       []  See Plan of Care  []  See progress note/recertification  []  See Discharge Summary         Progress towards goals / Updated goals:  Short Term Goals: To be accomplished in 2 weeks:                   HVYNTHM will report compliance with HEP 1x/day to aid in rehabilitation program.                   Status at IE: NA                   Current: Met 3/9/2020                      Patient will increase lumbar ROM to 100% in all planes to aid in completion of ADLs.                  Status at IE:75% in SB                   Current: Met 100% 3/9/2020     Long Term Goals: To be accomplished in 6 weeks:                   Patient will increase B LE and UE strength to 5/5 MMT throughout to aid in completion of ADLs.                  Status at IE:4/5 in hips and shoulder abduction                   Current: 4+/5 hip ext, progressing 3/5/2020                      Patient will report pain no greater than 5/10 throughout entire day to aid in completion of ADLs.                  Status at IE:5-10                   Current: 3-5-10, only pain when moving. Progressing 3/19/20/20                      Patent will perform 10sit<>stands with no pain to be able to complete ADLs.                  Status at [address] [place] [address] upon standing                   Current:  In-progress, 2 x 10 sit to stands with 20#                    Patient will improve FOTO score to 52 points to demonstrate improvement in functional status.                    Status at IE:33                   Current:  Met 53 3/9/2020                   *FOTO score is an established functional score where 100 = no disability*    PLAN  []  Upgrade activities as tolerated     [x]  Continue plan of care  []  Update interventions per flow sheet       []  Discharge due to:_  []  Other:_      Florin Zamora, PT, DPT 3/19/2020  9:27 AM    Future Appointments   Date Time Provider Eduardo Ludwig   3/19/2020  9:30 AM Hetty Castleman, PT [facility]   3/24/2020  9:30 AM Hetty Castleman, PT [facility]   3/26/2020  9:30 AM Hetty Castleman, PT [facility]   3/30/2020 10:30 AM Mp Zayas, PT, DPT [facility]

## 2020-03-24 ENCOUNTER — HOSPITAL ENCOUNTER (OUTPATIENT)
Dept: PHYSICAL THERAPY | Age: 55
Discharge: HOME | End: 2020-03-24
Payer: COMMERCIAL

## 2020-03-24 PROCEDURE — 97110 THERAPEUTIC EXERCISES: CPT

## 2020-03-24 NOTE — PROGRESS NOTES
PT DAILY TREATMENT NOTE     Patient Name: Rowan Plan  Date:3/24/2020  : 1965  [x]  Patient  Verified  Payor: Donavon Ruiz / Plan: [employer] / Product Type: HMO /    In ZSZE:[id number]  Out time:1025  Total Treatment Time (min): 50  Total Timed Codes (min): 50   Visit #: 20 of 27    Treatment Area: Low back pain [M54.5]  Neck pain [M54.2]    SUBJECTIVE  Pain Level (0-10 scale): 5 back 3 neck  Any medication changes, allergies to medications, adverse drug reactions, diagnosis change, or new procedure performed?: [x] No    [] Yes (see summary sheet for update)  Subjective functional status/changes:   [] No changes reported  Patient reports that she back is stiff and sore today. She states that she is wearing a brace to help with her pain. OBJECTIVE    50 min Therapeutic Exercise:  [x] See flow sheet :   Rationale: increase ROM, increase strength and decrease pain to improve the patients ability to complete ADLs          With   [x] TE   [] TA   [] neuro   [] other: Patient Education: [x] Review HEP    [] Progressed/Changed HEP based on:   [] positioning   [] body mechanics   [] transfers   [] heat/ice application    [] other:       Other Objective/Functional Measures: NA     Pain Level (0-10 scale) post treatment: 3    ASSESSMENT/Changes in Function: Patient responds well to treatment session. Had patient remove back brace during treatment. Patient had a reduction in symptoms with single leg press. Will advance exercise as appropriate.  No adverse effects were noted from today's treatment session      Patient will continue to benefit from skilled PT services to modify and progress therapeutic interventions, address functional mobility deficits, address ROM deficits, address strength deficits, analyze and address soft tissue restrictions, analyze and cue movement patterns, analyze and modify body mechanics/ergonomics, assess and modify postural abnormalities, and instruct in home and community integration to attain remaining goals. []  See Plan of Care  []  See progress note/recertification  []  See Discharge Summary         Progress towards goals / Updated goals:  Short Term Goals: To be accomplished in 2 weeks:                   TAHIRA will report compliance with HEP 1x/day to aid in rehabilitation program.                   Status at IE: NA                   Current: Met 3/9/2020                      Patient will increase lumbar ROM to 100% in all planes to aid in completion of ADLs.                  Status at IE:75% in SB                   Current: Met 100% 3/9/2020     Long Term Goals: To be accomplished in 6 weeks:                   Patient will increase B LE and UE strength to 5/5 MMT throughout to aid in completion of ADLs.                  Status at IE:4/5 in hips and shoulder abduction                   Current: 4+/5 hip ext, progressing 3/5/2020                      Patient will report pain no greater than 5/10 throughout entire day to aid in completion of ADLs.                  Status at IE:5-10                   Current: 3-5/10, when weightbearing and lifting. Progressing 3/24/20/20                      Patent will perform 10sit<>stands with no pain to be able to complete ADLs.                  Status at [address] [place] [address] upon standing                   Current:  In-progress, 2 x 10 sit to stands with 20#                    Patient will improve FOTO score to 52 points to demonstrate improvement in functional status.                    Status at IE:33                   Current:  Met 53 3/9/2020                   *FOTO score is an established functional score where 100 = no disability*    PLAN  []  Upgrade activities as tolerated     [x]  Continue plan of care  []  Update interventions per flow sheet       []  Discharge due to:_  []  Other:_      Samuel Pope, PT, DPT 3/24/2020  10:08 AM    Future Appointments   Date Time Provider Eduardo Ludwig   3/26/2020  9:00 AM Britney Briceño, PT, DPT [facility] 3/30/2020 11:00 AM Roshni Mcintyre PT, DPT [facility]   4/2/2020  9:30 AM Roshni Mcintyre PT, DPT [facility]   4/6/2020  9:00 AM LILIYA [facility]TMAXIME [facility]   4/9/2020  9:30 AM Roshni Mcintyre PT, DPT [facility]

## 2020-03-26 ENCOUNTER — HOSPITAL ENCOUNTER (OUTPATIENT)
Dept: PHYSICAL THERAPY | Age: 55
Discharge: HOME | End: 2020-03-26
Payer: COMMERCIAL

## 2020-03-26 PROCEDURE — 97110 THERAPEUTIC EXERCISES: CPT | Performed by: PHYSICAL THERAPIST

## 2020-03-26 NOTE — PROGRESS NOTES
PT DAILY TREATMENT NOTE    Patient Name: Pablo Mcdonough  Date:3/26/2020  : 1965  [x]  Patient  Verified  Payor: Tate Single / Plan: [employer] / Product Type: HMO /    In time:9:20  Out time:10:32  Total Treatment Time (min): 72  Total Timed Codes (min): 60  Visit #:  of     Treatment Area: Low back pain [M54.5]  Neck pain [M54.2]    SUBJECTIVE  Pain Level (0-10 scale): 5 back, 4 neck  Any medication changes, allergies to medications, adverse drug reactions, diagnosis change, or new procedure performed?: [x] No    [] Yes (see summary sheet for update)  Subjective functional status/changes:   [] No changes reported  Feels good. Not as good as the other day, but still feeling good. OBJECTIVE    60 min Therapeutic Exercise:  [x] See flow sheet :   Rationale: increase ROM, increase strength and decrease pain to improve the patients ability to complete ADLs       With   [] TE   [] TA   [] neuro   [] other: Patient Education: [x] Review HEP    [] Progressed/Changed HEP based on:   [] positioning   [] body mechanics   [] transfers   [] heat/ice application    [] other:      Other Objective/Functional Measures: NA     Pain Level (0-10 scale) post treatment: 3    ASSESSMENT/Changes in Function: Patient responds well to treatment session. Patient is progressing well. Minimal difficulty with exercises as prescribed. Progress as tolerated. . No adverse effects were noted from today's treatment session       Patient will continue to benefit from skilled PT services to modify and progress therapeutic interventions, address functional mobility deficits, address ROM deficits, address strength deficits, analyze and address soft tissue restrictions, analyze and cue movement patterns, analyze and modify body mechanics/ergonomics, assess and modify postural abnormalities    []  See Plan of Care  []  See progress note/recertification  []  See Discharge Summary         Progress towards goals / Updated goals:  Short Term Goals: To be accomplished in 2 weeks:                   Patient will report compliance with HEP 1x/day to aid in rehabilitation program.                   Status at IE: NA                   Current: Met 3/9/2020                      Patient will increase lumbar ROM to 100% in all planes to aid in completion of ADLs.                  Status at IE:75% in SB                   Current: Met 100% 3/9/2020     Long Term Goals: To be accomplished in 6 weeks:                   Patient will increase B LE and UE strength to 5/5 MMT throughout to aid in completion of ADLs.                  Status at IE:4/5 in hips and shoulder abduction                   Current: 4+/5 hip ext, progressing 3/5/2020                      Patient will report pain no greater than 5/10 throughout entire day to aid in completion of ADLs.                  Status at IE:5-10                   Current: 3-5/10, when weightbearing and lifting. Progressing 3/24/20/20                      Patent will perform 10sit<>stands with no pain to be able to complete ADLs.                  Status at [address] [place] [address] upon standing                   Current:  In-progress, 2 x 10 sit to stands with 20#                    Patient will improve FOTO score to 52 points to demonstrate improvement in functional status.                    Status at IE:33                   Current:  Met 53 3/9/2020                   *FOTO score is an established functional score where 100 = no disability*    PLAN  []  Upgrade activities as tolerated     [x]  Continue plan of care  []  Update interventions per flow sheet       []  Discharge due to:_  []  Other:_      Trevor Gama, PT, DPT 3/26/2020  9:35 AM    Future Appointments   Date Time Provider Eduardo Ludwig   3/30/2020 11:00 AM Brendon Martin PT, DPT [facility]   4/2/2020  9:30 AM Brendon Martin PT, DPT [facility]   4/6/2020  9:00 AM Hipolito Kawasaki, PT [facility]   4/9/2020  9:30 AM Brendon Martin PT, DPT [facility]

## 2020-03-30 ENCOUNTER — APPOINTMENT (OUTPATIENT)
Dept: PHYSICAL THERAPY | Age: 55
End: 2020-03-30
Payer: COMMERCIAL

## 2020-03-31 ENCOUNTER — HOSPITAL ENCOUNTER (OUTPATIENT)
Dept: PHYSICAL THERAPY | Age: 55
Discharge: HOME | End: 2020-03-31
Payer: COMMERCIAL

## 2020-03-31 PROCEDURE — 97110 THERAPEUTIC EXERCISES: CPT | Performed by: PHYSICAL THERAPIST

## 2020-03-31 NOTE — PROGRESS NOTES
PT DAILY TREATMENT NOTE    Patient Name: Gómez Gibbs  Date:3/31/2020  : 1965  [x]  Patient  Verified  Payor: Dilan Black / Plan: [employer] / Product Type: HMO /    In time:2:00  Out time:3:05  Total Treatment Time (min): 65  Total Timed Codes (min): 65  Visit #: 22 of 27    Treatment Area: Low back pain [M54.5]  Neck pain [M54.2]    SUBJECTIVE  Pain Level (0-10 scale): 6  Any medication changes, allergies to medications, adverse drug reactions, diagnosis change, or new procedure performed?: [x] No    [] Yes (see summary sheet for update)  Subjective functional status/changes:   [] No changes reported  Feels okay. The exercise is really helping    OBJECTIVE    65 min Therapeutic Exercise:  [x] See flow sheet :   Rationale: increase ROM, increase strength and decrease pain to improve the patients ability to complete ADLs       With   [] TE   [] TA   [] neuro   [] other: Patient Education: [x] Review HEP    [] Progressed/Changed HEP based on:   [] positioning   [] body mechanics   [] transfers   [] heat/ice application    [] other:      Other Objective/Functional Measures: NA     Pain Level (0-10 scale) post treatment: 4    ASSESSMENT/Changes in Function: Patient responds well to treatment session. Leonarda educated on load vs capacity. She was receptive. Will increase kettle bell lifts next visit.  No adverse effects were noted from today's treatment session     Patient will continue to benefit from skilled PT services to modify and progress therapeutic interventions, address functional mobility deficits, address ROM deficits, address strength deficits, analyze and address soft tissue restrictions, analyze and cue movement patterns, analyze and modify body mechanics/ergonomics, assess and modify postural abnormalities    []  See Plan of Care  []  See progress note/recertification  []  See Discharge Summary         Progress towards goals / Updated goals:  Short Term Goals: To be accomplished in 2 weeks:                   VQMIMBX will report compliance with HEP 1x/day to aid in rehabilitation program.                   Status at IE: NA                   Current: Met 3/9/2020                      Patient will increase lumbar ROM to 100% in all planes to aid in completion of ADLs.                  Status at IE:75% in SB                   Current: Met 100% 3/9/2020     Long Term Goals: To be accomplished in 6 weeks:                   Patient will increase B LE and UE strength to 5/5 MMT throughout to aid in completion of ADLs.                  Status at IE:4/5 in hips and shoulder abduction                   Current: 4+/5 hip ext, progressing 3/5/2020                      Patient will report pain no greater than 5/10 throughout entire day to aid in completion of ADLs.                  Status at IE:5-10                   Current: 3-5/10, when weightbearing and lifting. Progressing 3/24/20/20                      Patent will perform 10sit<>stands with no pain to be able to complete ADLs.                  Status at [address] [place] [address] upon standing                   Current:  In-progress, 2 x 10 sit to stands with 20#                    Patient will improve FOTO score to 52 points to demonstrate improvement in functional status.                    Status at IE:33                   Current:  Met 53 3/9/2020                   *FOTO score is an established functional score where 100 = no disability*    PLAN  []  Upgrade activities as tolerated     [x]  Continue plan of care  []  Update interventions per flow sheet       []  Discharge due to:_  []  Other:_      Amy Gama, PT, DPT 3/31/2020  2:13 PM    Future Appointments   Date Time Provider Eduardo Ludwig   4/2/2020  9:30 AM Katya Rangel PT, DPT [facility]   4/6/2020  9:00 AM Bonnie Leyva PT [facility]   4/9/2020  9:00 AM Katya Rangel PT, DPT [facility]

## 2020-04-02 ENCOUNTER — HOSPITAL ENCOUNTER (OUTPATIENT)
Dept: PHYSICAL THERAPY | Age: 55
Discharge: HOME | End: 2020-04-02
Payer: COMMERCIAL

## 2020-04-02 PROCEDURE — 97110 THERAPEUTIC EXERCISES: CPT | Performed by: PHYSICAL THERAPIST

## 2020-04-02 NOTE — PROGRESS NOTES
PT DAILY TREATMENT NOTE    Patient Name: Jenifer Nelson  Date:2020  : 1965  [x]  Patient  Verified  Payor: Shandra Schofield / Plan: [employer] / Product Type: HMO /    In time:9:30  Out time:10:19  Total Treatment Time (min): 49  Total Timed Codes (min): 49   Visit #: 23 of 27    Treatment Area: Low back pain [M54.5]  Neck pain [M54.2]    SUBJECTIVE  Pain Level (0-10 scale): 4  Any medication changes, allergies to medications, adverse drug reactions, diagnosis change, or new procedure performed?: [x] No    [] Yes (see summary sheet for update)  Subjective functional status/changes:   [] No changes reported  Feels good. Went     OBJECTIVE    49 min Therapeutic Exercise:  [x] See flow sheet :   Rationale: increase ROM, increase strength and decrease pain to improve the patients ability to complete ADLs       With   [] TE   [] TA   [] neuro   [] other: Patient Education: [x] Review HEP    [] Progressed/Changed HEP based on:   [] positioning   [] body mechanics   [] transfers   [] heat/ice application    [] other:      Other Objective/Functional Measures: NA     Pain Level (0-10 scale) post treatment: 3    ASSESSMENT/Changes in Function: Patient responds well to treatment session. Patient is able to complete increased .  No adverse effects were noted from today's treatment session       Patient will continue to benefit from skilled PT services to modify and progress therapeutic interventions, address functional mobility deficits, address ROM deficits, address strength deficits, analyze and address soft tissue restrictions, analyze and cue movement patterns, analyze and modify body mechanics/ergonomics, assess and modify postural abnormalities, address imbalance/dizziness and instruct in home and community integration to attain remaining goals      []  See Plan of Care  []  See progress note/recertification  []  See Discharge Summary         Progress towards goals / Updated goals:  Short Term Goals: To be accomplished in 2 weeks:                   Patient will report compliance with HEP 1x/day to aid in rehabilitation program.                   Status at IE: NA                   Current: Met 3/9/2020                      Patient will increase lumbar ROM to 100% in all planes to aid in completion of ADLs.                  Status at IE:75% in SB                   Current: Met 100% 3/9/2020     Long Term Goals: To be accomplished in 6 weeks:                   Patient will increase B LE and UE strength to 5/5 MMT throughout to aid in completion of ADLs.                  Status at IE:4/5 in hips and shoulder abduction                   Current: 4+/5 hip ext, progressing 3/5/2020                      Patient will report pain no greater than 5/10 throughout entire day to aid in completion of ADLs.                  Status at IE:5-10                   Current: 3-5/10, when weightbearing and lifting. Progressing 3/24/20/20                      Patent will perform 10sit<>stands with no pain to be able to complete ADLs.                  Status at [address] [place] [address] upon standing                   Current:  In-progress, 3 x 10 sit to stands with 25#, some pain, 4/2/2020                   Patient will improve FOTO score to 52 points to demonstrate improvement in functional status.                    Status at IE:33                   Current:  Met 53 3/9/2020                   *FOTO score is an established functional score where 100 = no disability*    PLAN  []  Upgrade activities as tolerated     [x]  Continue plan of care  []  Update interventions per flow sheet       []  Discharge due to:_  []  Other:_      Daniela Galeas PT, DPT 4/2/2020  9:16 AM    Future Appointments   Date Time Provider Eduardo Ludwig   4/2/2020  9:30 AM Aniket Andres PT, DPT [facility]   4/6/2020  9:00 AM Sue Palacio PT [facility]   4/9/2020  9:00 AM Aniket Andres PT, DPT [facility]

## 2020-04-06 ENCOUNTER — HOSPITAL ENCOUNTER (OUTPATIENT)
Dept: PHYSICAL THERAPY | Age: 55
Discharge: HOME | End: 2020-04-06
Payer: COMMERCIAL

## 2020-04-06 PROCEDURE — 97110 THERAPEUTIC EXERCISES: CPT

## 2020-04-06 NOTE — PROGRESS NOTES
PT DAILY TREATMENT NOTE     Patient Name: Jenifer Nelson  Date:2020  : 1965  [x]  Patient  Verified  Payor: Shandra Schofield / Plan: [employer] / Product Type: HMO /    In time:0900  Out time:1000  Total Treatment Time (min): 60  Total Timed Codes (min): 60   Visit #: 24 of 27    Treatment Area: Low back pain [M54.5]  Neck pain [M54.2]    SUBJECTIVE  Pain Level (0-10 scale): 4 back 2 neck 3  Any medication changes, allergies to medications, adverse drug reactions, diagnosis change, or new procedure performed?: [x] No    [] Yes (see summary sheet for update)  Subjective functional status/changes:   [] No changes reported    Patient reports that she is doing well and is compliant with HEP. OBJECTIVE    60 min Therapeutic Exercise:  [x] See flow sheet :   Rationale: increase ROM, increase strength and decrease pain to improve the patients ability to complete ADLs        With   [x] TE   [] TA   [] neuro   [] other: Patient Education: [x] Review HEP    [] Progressed/Changed HEP based on:   [] positioning   [] body mechanics   [] transfers   [] heat/ice application    [] other:      Other Objective/Functional Measures: NA     Pain Level (0-10 scale) post treatment: 4    ASSESSMENT/Changes in Function: Patient responds well to treatment session. Patient required cues to pace herself. Progressed exercise by introducing cable exercises. She was challenged with exercise prescribed. Will continue to advance exercise in future visits.  No adverse effects were noted from today's treatment session    Patient will continue to benefit from skilled PT services to modify and progress therapeutic interventions, address functional mobility deficits, address ROM deficits, address strength deficits, analyze and address soft tissue restrictions, analyze and cue movement patterns, analyze and modify body mechanics/ergonomics, assess and modify postural abnormalities, and instruct in home and community integration to attain remaining goals. []  See Plan of Care  []  See progress note/recertification  []  See Discharge Summary         Progress towards goals / Updated goals:  Short Term Goals: To be accomplished in 2 weeks:                   DDWYASN will report compliance with HEP 1x/day to aid in rehabilitation program.                   Status at IE: NA                   Current: Met 3/9/2020                      Patient will increase lumbar ROM to 100% in all planes to aid in completion of ADLs.                  Status at IE:75% in SB                   Current: Met 100% 3/9/2020     Long Term Goals: To be accomplished in 6 weeks:                   Patient will increase B LE and UE strength to 5/5 MMT throughout to aid in completion of ADLs.                  Status at IE:4/5 in hips and shoulder abduction                   Current: 4+/5 hip ext, progressing 3/5/2020                      Patient will report pain no greater than 5/10 throughout entire day to aid in completion of ADLs.                  Status at IE:5-10                   Current: 3-5/10, when weightbearing and lifting. Progressing 3/24/20/20                      Patent will perform 10sit<>stands with no pain to be able to complete ADLs.                  Status at [address] [place] [address] upon standing                   Current:  In-progress, 3 x 10 sit to stands with 25#, some pain, 4/2/2020                   Patient will improve FOTO score to 52 points to demonstrate improvement in functional status.                    Status at IE:33                   Current:  Met 53 3/9/2020                   *FOTO score is an established functional score where 100 = no disability*    PLAN  []  Upgrade activities as tolerated     [x]  Continue plan of care  []  Update interventions per flow sheet       []  Discharge due to:_  []  Other:_      Kiara Gracia PT, DPT 4/6/2020  8:25 AM    Future Appointments   Date Time Provider Eduardo Ludwig   4/6/2020  9:00 AM Shayy Dill PT [facility]   4/9/2020  9:00 AM Eli Avendaño, PT, DPT [facility]

## 2020-04-09 ENCOUNTER — HOSPITAL ENCOUNTER (OUTPATIENT)
Dept: PHYSICAL THERAPY | Age: 55
Discharge: HOME | End: 2020-04-09
Payer: COMMERCIAL

## 2020-04-09 PROCEDURE — 97110 THERAPEUTIC EXERCISES: CPT | Performed by: PHYSICAL THERAPIST

## 2020-04-09 NOTE — PROGRESS NOTES
PT DAILY TREATMENT NOTE    Patient Name: Titus Saini  Date:2020  : 1965  [x]  Patient  Verified  Payor: Myron Ground / Plan: [employer] / Product Type: HMO /    In time:10:00  Out time:10:58  Total Treatment Time (min): 58  Total Timed Codes (min): 58  Visit #: 25 of 27    Treatment Area: Low back pain [M54.5]  Neck pain [M54.2]    SUBJECTIVE  Pain Level (0-10 scale): 3  Any medication changes, allergies to medications, adverse drug reactions, diagnosis change, or new procedure performed?: [x] No    [] Yes (see summary sheet for update)  Subjective functional status/changes:   [] No changes reported  Feels good. No new issues. OBJECTIVE    58 min Therapeutic Exercise:  [x] See flow sheet :   Rationale: increase ROM, increase strength and decrease pain to improve the patients ability to complete ADLs         With   [] TE   [] TA   [] neuro   [] other: Patient Education: [x] Review HEP    [] Progressed/Changed HEP based on:   [] positioning   [] body mechanics   [] transfers   [] heat/ice application    [] other:      Other Objective/Functional Measures: NA     Pain Level (0-10 scale) post treatment: 4    ASSESSMENT/Changes in Function: Patient responds well to treatment session. Patient is progressing well with increased load/resistance levels. Patient has been receptive to educational strategies and will benefit from continued therapy to transition to HEP effectively over the next two weeks.  No adverse effects were noted from today's treatment session       Patient will continue to benefit from skilled PT services to modify and progress therapeutic interventions, address functional mobility deficits, address ROM deficits, address strength deficits, analyze and address soft tissue restrictions, analyze and cue movement patterns, analyze and modify body mechanics/ergonomics, assess and modify postural abnormalities    []  See Plan of Care  []  See progress note/recertification  []  See Discharge Summary         Progress towards goals / Updated goals:  Short Term Goals: To be accomplished in 2 weeks:                   PHVLMON will report compliance with HEP 1x/day to aid in rehabilitation program.                   Status at IE: NA                   Current: Met 3/9/2020                      Patient will increase lumbar ROM to 100% in all planes to aid in completion of ADLs.                  Status at IE:75% in SB                   Current: Met 100% 3/9/2020     Long Term Goals: To be accomplished in 6 weeks:                   Patient will increase B LE and UE strength to 5/5 MMT throughout to aid in completion of ADLs.                  Status at IE:4/5 in hips and shoulder abduction                   Current: 4+/5 hip ext, progressing 3/5/2020                      Patient will report pain no greater than 5/10 throughout entire day to aid in completion of ADLs.                  Status at IE:5-10                   Current: 3-5/10, when weightbearing and lifting. Progressing 3/24/20/20                      Updated Goal: Patent will perform 10 squats with 40lbs no pain to be able to complete ADLs.                  Status at [facility] upon standing                   Current:  In-progress, 3 x 10 sit to stands with 25#, some pain, 4/2/2020                         Updated Goal: Patient will improve FOTO score to 65 points to demonstrate improvement in functional status.                  Status at IE:33                   Current: 53 3/9/2020                   *FOTO score is an established functional score where 100 = no disability*    PLAN  []  Upgrade activities as tolerated     [x]  Continue plan of care  []  Update interventions per flow sheet       []  Discharge due to:_  []  Other:_      Eric White PT, DPT 4/9/2020  10:10 AM    No future appointments.

## 2020-04-09 NOTE — PROGRESS NOTES
[facility] at [address] [place] [address] Drive: [phone]   Fax: [phone]  Progress Note  Patient Name: Evens Gauthier : 1965   Medical   Diagnosis: Low back pain [M54.5]  Neck pain [M54.2] Treatment Diagnosis: Low back pain, neck pain   Onset Date: chronic     Referral Source: Carroll Laird DO Start of Care [facility]): 2020   Prior Hospitalization: See medical history Provider #: [id number]   Prior Level of Function: Sedentary, independent w/ ADLs   Comorbidities: Left BBB   Medications: Verified on Patient Summary List      ===========================================================================================  Assessment / Summary of Care:  Evens Gauthier is a 53 y.o.  yo female with chronic low back pain. . Patient is progressing well with increased load/resistance levels. Patient has been receptive to educational strategies and will benefit from continued therapy to transition to HEP effectively over the next two weeks.  No adverse effects were noted from today's treatment session  Patient will continue to benefit from skilled PT services to modify and progress therapeutic interventions, address functional mobility deficits, address ROM deficits, address strength deficits, analyze and address soft tissue restrictions, analyze and cue movement patterns, analyze and modify body mechanics/ergonomics, assess and modify postural abnormalities    ===========================================================================================    Plan:Continue therapy per initial plan/protocol at a frequency of  2 x per week for 2 weeks    Goals:   Short Term Goals: To be accomplished in 2 weeks:                   Patient will report compliance with HEP 1x/day to aid in rehabilitation program.                   Status at : NA                   Current: Met 3/9/2020                      Patient will increase lumbar ROM to 100% in all planes to aid in completion of ADLs.                  Status at IE:75% in SB                   Current: Met 100% 3/9/2020     Long Term Goals: To be accomplished in 6 weeks:                   Patient will increase B LE and UE strength to 5/5 MMT throughout to aid in completion of ADLs.                  Status at IE:4/5 in hips and shoulder abduction                   Current: 4+/5 hip ext, progressing 3/5/2020                      Patient will report pain no greater than 5/10 throughout entire day to aid in completion of ADLs.                  Status at IE:5-10                   Current: 3-5/10, when weightbearing and lifting. Progressing 3/24/20/20                      Updated Goal: Patent will perform 10 squats with 40lbs no pain to be able to complete ADLs.                  Status at [facility] upon standing                   Current:  In-progress, 3 x 10 sit to stands with 25#, some pain, 4/2/2020                                    Updated Goal: Patient will improve FOTO score to 65 points to demonstrate improvement in functional status.                  Status at IE:33                   Current: 53 3/9/2020                   *FOTO score is an established functional score where 100 = no disability*       ===========================================================================================  Subjective: Feels good. Feeling better overall. Concerned about transitioning to independent care, feels that therapy has been very helpful up to this time      Objective: MMT 5/5, except hip extension 4-/5    Therapist Signature: Attila Christie, PT, DPT, OCS Date: 4/2/4356   Re-Certification: NA Time: 4:77 PM       I certify that the above Therapy Services are being furnished while the patient is under my care. I agree with the treatment plan and certify that this therapy is necessary.       [de-identified] Signature:____________                  Date:_________TIME:________    [employer], Date and Time must be completed for valid certification **    [facility] [address] [place] [address]         Phone: [phone]   Fax: [phone]  .

## 2020-04-13 ENCOUNTER — HOSPITAL ENCOUNTER (OUTPATIENT)
Dept: PHYSICAL THERAPY | Age: 55
Discharge: HOME | End: 2020-04-13
Payer: COMMERCIAL

## 2020-04-13 PROCEDURE — 97110 THERAPEUTIC EXERCISES: CPT

## 2020-04-13 NOTE — PROGRESS NOTES
PT DAILY TREATMENT NOTE     Patient Name: Jayla Flynn  TEEH:[id number]  : 1965  [x]  Patient  Verified  Payor: Felix Sandhu / Plan: [employer] / Product Type: HMO /    In time:1200  Out time:1253  Total Treatment Time (min): 53  Total Timed Codes (min): 53   Visit #: 26 of 27    Treatment Area: Low back pain [M54.5]  Neck pain [M54.2]    SUBJECTIVE  Pain Level (0-10 scale): 3 neck and 3 back  Any medication changes, allergies to medications, adverse drug reactions, diagnosis change, or new procedure performed?: [x] No    [] Yes (see summary sheet for update)  Subjective functional status/changes:   [] No changes reported  Patient reports that she is doing well but had to take tylenol yesterday secondary to pain. OBJECTIVE    53 min Therapeutic Exercise:  [x] See flow sheet :   Rationale: increase ROM, increase strength and decrease pain to improve the patients ability to complete ADLs          With   [x] TE   [] TA   [] neuro   [] other: Patient Education: [x] Review HEP    [] Progressed/Changed HEP based on:   [] positioning   [] body mechanics   [] transfers   [] heat/ice application    [] other:      Other Objective/Functional Measures: NA     Pain Level (0-10 scale) post treatment: 2 back 3 neck    ASSESSMENT/Changes in Function:     Patient responds well to treatment session. Patient required cues for activity pacing. Reviewed mechanics for cable column promoting proper mechanics and muscle recruitment. Patient responded well to cueing and demonstrated improved tolerance to the exercise.  No adverse effects were noted from today's treatment session    Patient will continue to benefit from skilled PT services to modify and progress therapeutic interventions, address functional mobility deficits, address ROM deficits, address strength deficits, analyze and address soft tissue restrictions, analyze and cue movement patterns, analyze and modify body mechanics/ergonomics, assess and modify postural abnormalities, and instruct in home and community integration to attain remaining goals. []  See Plan of Care  []  See progress note/recertification  []  See Discharge Summary         Progress towards goals / Updated goals:  Short Term Goals: To be accomplished in 2 weeks:                   POLLY will report compliance with HEP 1x/day to aid in rehabilitation program.                   Status at IE: NA                   Current: Met 3/9/2020                      Patient will increase lumbar ROM to 100% in all planes to aid in completion of ADLs.                  Status at IE:75% in SB                   Current: Met 100% 3/9/2020     Long Term Goals: To be accomplished in 6 weeks:                   Patient will increase B LE and UE strength to 5/5 MMT throughout to aid in completion of ADLs.                  Status at IE:4/5 in hips and shoulder abduction                   Current: 4+/5 hip ext, progressing 3/5/2020                      Patient will report pain no greater than 5/10 throughout entire day to aid in completion of ADLs.                  Status at IE:5-10                   Current: 3-5/10, when weightbearing and lifting. Progressing 3/24/20/20                      SALLY Goal: Patent will perform 10 squats with 40lbs no pain to be able to complete ADLs.                  Status at [address] [place] [address] upon standing                   Current:  In-progress, 3 x 10 sit to stands with 25#, some pain, 4/2/2020                                    DCIZZUX Goal: Patient will improve FOTO score to 65 points to demonstrate improvement in functional status.                    Status at IE:33                   Current: 53 3/9/2020                   *FOTO score is an established functional score where 100 = no disability*    PLAN  []  Upgrade activities as tolerated     [x]  Continue plan of care  []  Update interventions per flow sheet       []  Discharge due to:_  []  Other:_      Angella Diego, PT, DPT 4/13/2020  11:25 AM    Future Appointments   Date Time Provider Eduardo Ludwig   4/13/2020 12:00 PM Jorge Luis Ram, LILIYA SANDOVAL [facility]   4/15/2020 12:00 PM LILIYA Bee [facility]

## 2020-04-15 ENCOUNTER — HOSPITAL ENCOUNTER (OUTPATIENT)
Dept: PHYSICAL THERAPY | Age: 55
Discharge: HOME | End: 2020-04-15
Payer: COMMERCIAL

## 2020-04-15 PROCEDURE — 97110 THERAPEUTIC EXERCISES: CPT

## 2020-04-15 NOTE — PROGRESS NOTES
PT DAILY TREATMENT NOTE - [facility]     Patient Name: Opal Bernard  YDAB:[id number]/[id number]  : 1965  [x]  Patient  Verified  Payor: [facility] / Plan: [employer] / Product Type: HMO /    In time:1100  Out time:1200  Total Treatment Time (min): 60  Total Timed Codes (min): 60   1:1 Treatment Time ( only): 60   Visit #: 27 of 29    Treatment Area: Low back pain [M54.5]  Neck pain [M54.2]    SUBJECTIVE  Pain Level (0-10 scale): 4 back 3 neck  Any medication changes, allergies to medications, adverse drug reactions, diagnosis change, or new procedure performed?: [x] No    [] Yes (see summary sheet for update)  Subjective functional status/changes:   [] No changes reported    Patient reports that not performing leg press resulted in less pain after treatment. OBJECTIVE    60 min Therapeutic Exercise:  [x] See flow sheet :   Rationale: increase ROM, increase strength and decrease pain to improve the patients ability to complete ADLs          With   [x] TE   [] TA   [] neuro   [] other: Patient Education: [x] Review HEP    [] Progressed/Changed HEP based on:   [] positioning   [] body mechanics   [] transfers   [] heat/ice application    [] other:      Other Objective/Functional Measures: NA    Pain Level (0-10 scale) post treatment:  2 back 3 neck     ASSESSMENT/Changes in Function: Patient responds well to treatment session. Patient is becoming autonomous with exercise requiring fewer cues for form and activity pacing. Will review advanced HEP next visit.  No adverse effects were noted from today's treatment session      Patient will continue to benefit from skilled PT services to modify and progress therapeutic interventions, address functional mobility deficits, address ROM deficits, address strength deficits, analyze and address soft tissue restrictions, analyze and cue movement patterns, analyze and modify body mechanics/ergonomics, assess and modify postural abnormalities, address imbalance/dizziness and instruct in home and community integration to attain remaining goals. []  See Plan of Care  []  See progress note/recertification  []  See Discharge Summary         Progress towards goals / Updated goals:  Short Term Goals: To be accomplished in 2 weeks:                   NWUJBIF will report compliance with HEP 1x/day to aid in rehabilitation program.                   Status at IE: NA                   Current: Met 3/9/2020                      Patient will increase lumbar ROM to 100% in all planes to aid in completion of ADLs.                  Status at IE:75% in SB                   Current: Met 100% 3/9/2020     Long Term Goals: To be accomplished in 6 weeks:                   Patient will increase B LE and UE strength to 5/5 MMT throughout to aid in completion of ADLs.                  Status at IE:4/5 in hips and shoulder abduction                   Current: 4+/5 hip ext, progressing 3/5/2020                      Patient will report pain no greater than 5/10 throughout entire day to aid in completion of ADLs.                  Status at IE:5-10                   Current: 3-4/10, when weightbearing and lifting. Met 4/15/20/20                      NNXOMAT Goal: Patent will perform 10 squats with 40lbs no pain to be able to complete ADLs.                  Status at [address] [place] [address] upon standing                   Current:  In-progress, 3 x 10 sit to stands with 25#, some pain, 4/2/2020                                    IIKFPNA Goal: Patient will improve FOTO score to 65 points to demonstrate improvement in functional status.                    Status at IE:33                   Current: 53 3/9/2020                   *FOTO score is an established functional score where 100 = no disability*    PLAN  []  Upgrade activities as tolerated     [x]  Continue plan of care  []  Update interventions per flow sheet       []  Discharge due to:_  []  Other:_      Shaji Bush, PT, DPT 4/15/2020  11:13 AM    Future Appointments   Date Time Provider Eduardo Ludwig   4/21/2020  3:00 PM Dave Cloey [facility]   4/24/2020  2:30 PM [facility]uita Gay, [facility], [facility]

## 2020-04-21 ENCOUNTER — HOSPITAL ENCOUNTER (OUTPATIENT)
Dept: PHYSICAL THERAPY | Age: 55
Discharge: HOME | End: 2020-04-21
Payer: COMMERCIAL

## 2020-04-21 PROCEDURE — 97110 THERAPEUTIC EXERCISES: CPT

## 2020-04-21 NOTE — PROGRESS NOTES
PT DAILY TREATMENT NOTE     Patient Name: Jaycob Rush  Date:2020  : 1965  [x]  Patient  Verified  Payor: Christian Jett / Plan: [employer] / Product Type: HMO /    In time:305  Out time:400  Total Treatment Time (min): 55  Total Timed Codes (min): 55   Visit #: 28 of 29    Treatment Area: Low back pain [M54.5]  Neck pain [M54.2]    SUBJECTIVE  Pain Level (0-10 scale): 3  Any medication changes, allergies to medications, adverse drug reactions, diagnosis change, or new procedure performed?: [x] No    [] Yes (see summary sheet for update)  Subjective functional status/changes:   [] No changes reported    Patient reports that her pain has decreased lately. She states that she has her times where she has increased pain but is less consistent. OBJECTIVE    55 min Therapeutic Exercise:  [x] See flow sheet :   Rationale: increase ROM, increase strength and decrease pain to improve the patients ability to complete ADLs          With   [x] TE   [] TA   [] neuro   [] other: Patient Education: [x] Review HEP    [] Progressed/Changed HEP based on:   [] positioning   [] body mechanics   [] transfers   [] heat/ice application    [] other:      Other Objective/Functional Measures: NA     Pain Level (0-10 scale) post treatment: 3    ASSESSMENT/Changes in Function: Patient responds well to treatment session. Patient demonstrated good autonomy with exercise requiring minimal cues for exercise form and pacing. Will reassess next visit for potential discharge.  No adverse effects were noted from today's treatment session    Patient will continue to benefit from skilled PT services to modify and progress therapeutic interventions, address functional mobility deficits, address ROM deficits, address strength deficits, analyze and address soft tissue restrictions, analyze and cue movement patterns, analyze and modify body mechanics/ergonomics, assess and modify postural abnormalities, instruct in home and community integration to attain remaining goals. []  See Plan of Care  []  See progress note/recertification  []  See Discharge Summary         Progress towards goals / Updated goals:  Short Term Goals: To be accomplished in 2 weeks:                   MARY will report compliance with HEP 1x/day to aid in rehabilitation program.                   Status at IE: NA                   Current: Met 3/9/2020                      Patient will increase lumbar ROM to 100% in all planes to aid in completion of ADLs.                  Status at IE:75% in SB                   Current: Met 100% 3/9/2020     Long Term Goals: To be accomplished in 6 weeks:                   Patient will increase B LE and UE strength to 5/5 MMT throughout to aid in completion of ADLs.                  Status at IE:4/5 in hips and shoulder abduction                   Current: 4+/5 hip ext, progressing 3/5/2020                      Patient will report pain no greater than 5/10 throughout entire day to aid in completion of ADLs.                  Status at IE:5-10                   Current: 3-4/10, when weightbearing and lifting. Met 4/15/20/20                      XYVGGBW Goal: Patent will perform 10 squats with 40lbs no pain to be able to complete ADLs.                  Status at [address] [place] [address] upon standing                   Current:  In-progress, 3 x 10 sit to stands with 25#, some pain, 4/2/2020                                    IPJYHCM Goal: Patient will improve FOTO score to 65 points to demonstrate improvement in functional status.                    Status at IE:33                   Current: 53 3/9/2020                   *FOTO score is an established functional score where 100 = no disability*  PLAN  []  Upgrade activities as tolerated     [x]  Continue plan of care  []  Update interventions per flow sheet       []  Discharge due to:_  []  Other:_      Telma Díaz, PT, DPT 4/21/2020  3:00 PM    Future Appointments   Date Time Provider Department [place]   4/24/2020  2:30 PM Eb Gordon, PT, DPT [facility]

## 2020-04-24 ENCOUNTER — HOSPITAL ENCOUNTER (OUTPATIENT)
Dept: PHYSICAL THERAPY | Age: 55
Discharge: HOME | End: 2020-04-24
Payer: COMMERCIAL

## 2020-04-24 PROCEDURE — 97110 THERAPEUTIC EXERCISES: CPT | Performed by: PHYSICAL THERAPIST

## 2020-04-24 NOTE — PROGRESS NOTES
PT DAILY TREATMENT NOTE    Patient Name: Les Bates  Date:2020  : 1965  [x]  Patient  Verified  Payor: Daniel Cool / Plan: [employer] / Product Type: HMO /    In time:2:30  Out time:3:25  Total Treatment Time (min): 55  Total Timed Codes (min): 55  Visit #: 29 of 29    Treatment Area: Low back pain [M54.5]  Neck pain [M54.2]    SUBJECTIVE  Pain Level (0-10 scale): 3  Any medication changes, allergies to medications, adverse drug reactions, diagnosis change, or new procedure performed?: [x] No    [] Yes (see summary sheet for update)  Subjective functional status/changes:   [] No changes reported  Feels good. Therapy has been very helpful. Ready to be discharged    OBJECTIVE    55 min Therapeutic Exercise:  [x] See flow sheet :   Rationale: increase ROM, increase strength and decrease pain to improve the patients ability to complete ADLs    With   [] TE   [] TA   [] neuro   [] other: Patient Education: [x] Review HEP    [] Progressed/Changed HEP based on:   [] positioning   [] body mechanics   [] transfers   [] heat/ice application    [] other:      Other Objective/Functional Measures: NA     Pain Level (0-10 scale) post treatment: 3    ASSESSMENT/Changes in Function: Patient responds well to treatment session. Patient has made good progress to date. Her pain has decreased with increase in functional capacity. Is capable of being discharged at this time. No adverse effects were noted from today's treatment session    []  See Plan of Care  []  See progress note/recertification  [x]  See Discharge Summary         Progress towards goals / Updated goals:  Short Term Goals: To be accomplished in 2 weeks:                   HQVYDSZ will report compliance with HEP 1x/day to aid in rehabilitation program.                   Status at IE: NA                   Current: Met 3/9/2020                      Patient will increase lumbar ROM to 100% in all planes to aid in completion of ADLs.                    Status at IE:75% in SB                   Current: Met 100% 3/9/2020     Long Term Goals: To be accomplished in 6 weeks:                   Patient will increase B LE and UE strength to 5/5 MMT throughout to aid in completion of ADLs.                  Status at IE:4/5 in hips and shoulder abduction                   Current: 4+/5 hip ext, progressing 3/5/2020                      Patient will report pain no greater than 5/10 throughout entire day to aid in completion of ADLs.                  Status at IE:5-10                   Current: 3-4/10, when weightbearing and lifting. Met 4/15/20/20                      Updated Goal: Patent will perform 10 squats with 40lbs no pain to be able to complete ADLs.                  Status at [facility] upon standing                   Current:  In-progress, 3 x 10 sit to stands with 25#, some pain, 4/2/2020                                    [facility] Goal: Patient will improve FOTO score to 65 points to demonstrate improvement in functional status.                  Status at IE:33                   Current: 53 3/9/2020                   *FOTO score is an established functional score where 100 = no disability*    PLAN  []  Upgrade activities as tolerated     []  Continue plan of care  []  Update interventions per flow sheet       [x]  Discharge due to:_  []  Other:_      Shawna Armendariz, PT, DPT 4/24/2020  2:30 PM    No future appointments.

## 2020-04-24 NOTE — PROGRESS NOTES
[facility] at [address] [place] [address] Drive: [phone]   Fax: [phone]  Discharge Summary  Patient Name: Renu Arzate : 1965   Medical   Diagnosis: Low back pain [M54.5]  Neck pain [M54.2] Treatment Diagnosis: Low back pain, neck pain   Onset Date: chronic     Referral Source: Ynes Macias DO [facility]): 1/3/2020   Prior Hospitalization: See medical history Provider #: [id number]   Prior Level of Function: Active, yard work, independent w/ ADLs   Comorbidities: Left BBB   Medications: Verified on Patient Summary List      ===========================================================================================  Assessment / Summary of Care:  Renu Arzate is a 53 y.o.  yo female with chronic low back pain. Patient has made good progress to date. Her pain has decreased with increase in functional capacity. Is capable of being discharged at this time. No adverse effects were noted from today's treatment session    ===========================================================================================    Plan: Discharge to independent [facility]. Goals:   Short Term Goals: To be accomplished in 2 weeks:                   OBITBYU will report compliance with HEP 1x/day to aid in rehabilitation program.                   Status at IE: NA                   Current: Met 3/9/2020                      Patient will increase lumbar ROM to 100% in all planes to aid in completion of ADLs.                  Status at IE:75% in SB                   Current: Met 100% 3/9/2020     Long Term Goals: To be accomplished in 6 weeks:                   Patient will increase B LE and UE strength to 5/5 MMT throughout to aid in completion of ADLs.                    Status at IE:4/5 in hips and shoulder abduction                   Current: 5/5 grossly, except left hip extension 4/5 2020                      Patient will report pain no greater than 5/10 throughout entire day to aid in completion of ADLs.                  Status at IE:5-10                   Current: 3/10, Met 4/24/2020                      PRNDMIE Goal: Patent will perform 10 squats with 40lbs no pain to be able to complete ADLs.                  Status at [address] [place] [address] upon standing                   Current:  In-progress, 3 x 10 sit to stands with 25#, some pain, 4/2/2020                                    PPGBHLU Goal: Patient will improve FOTO score to 52 points to demonstrate improvement in functional status.                  Status at IE:33                   Current: 58 4/24/2020                   *FOTO score is an established functional score where 100 = no disability*    ===========================================================================================  Subjective: Feels good. Therapy has been very helpful. Ready to be discharged      Objective: MMT 5/5 grossly, except left hip extension.  4/5    Therapist Signature: Heber Beckett PT, DPT, OCS Date: 4/24/2020     Time: 3:44 PM

## 2023-03-16 ENCOUNTER — HOSPITAL ENCOUNTER (OUTPATIENT)
Facility: HOSPITAL | Age: 58
Setting detail: RECURRING SERIES
Discharge: HOME | End: 2023-03-19
Payer: MEDICAID

## 2023-03-16 PROCEDURE — 97162 PT EVAL MOD COMPLEX 30 MIN: CPT

## 2023-03-16 NOTE — PROGRESS NOTES
untimed                               Therapeutic Procedures: Tx Min Billable or 1:1 Min (if diff from [employer]) Procedure, Rationale, Specifics          Details if applicable:            Details if applicable:            Details if applicable:            Details if applicable:            Details if applicable:       [facility] Totals Reminder: bill using total billable min of TIMED therapeutic procedures (example: do not include dry needle or estim unattended, both untimed codes, in totals to left)  8-22 min = 1 unit; 23-37 min = 2 units; 38-52 min = 3 units; 53-67 min = 4 units; 68-82 min = 5 units   Total Total     [x]  Patient Education billed concurrently with other procedures   [x] Review HEP    [] Progressed/Changed HEP, detail:    [] Other detail:       General Evaluation    Physical Therapy Evaluation - Lumbar Spine  :    OBJECTIVE  Posture:  Lateral Shift: [] Right    [] Left     [] +  [x] -  Kyphosis: [] Increased [] Decreased   [x]  WNL  Lordosis:  [] Increased [] Decreased   [x] WNL  Pelvic symmetry: [x] WNL    [] Other:    Gait:  [] Normal     [x] Abnormal: wide MAXIMILIAN; short step length    Active Movements: [] N/A   [] Too acute   [] Other:  ROM % AROM Comments:pain, area   Forward flexion 40-60 To toes  Pain with right side,    Extension 20-30 WFL Pain relief   SideBend right 20-30 To knee  Pain    SideBend left 20-30 To knee     Rotation right 5-10 75%     Rotation left 5-10 75%      Repeated Movements   Repeated extension makes pain better. Palpation  [] Min  [] Mod  [x] Severe    Location: Quadratus Lumborum tender to palpation     Strength   Left(0-5) Right (0-5) N/T   Hip Flexion (L1,2) 3+ 3+ P! []   Hip Extension  3 3 P! Knee Extension (L3,4) 5 5 []   Ankle Dorsiflexion (L4) 4+ 4+ []   Great Toe Extension (L5)   []   Ankle Plantarflexion (S1)   []   Knee Flexion (S1,2) 4 P! 3 P!  []   Abdominals   []   Paraspinals 5 5 []   Back Rotators   []   Gluteus Prince 5 5 []   Hip Abduction 5 3 \"resisted
disability     Patient will improve pain in low back to 5/10  to improve standing and walking tolerance and restore prior level of function. Eval Status: 20/10 at worst     Pt will have 5/5 bilateral hip flexion strength to return to restore prior level of function. Raheel Nose Status: Strength    Left(0-5) Right (0-5) N/T   Hip Flexion (L1,2) 3+ 3+ P! []          Pt will be able to stand for more than 10 minutes to aid in functional mechanics for ambulation/ADLs. Eval Status: not able to stand for long periods of time     Long Term Goals: To be accomplished in 6 weeks:  Patient will improve FOTO score by 22 points to improve functional tolerance for improved standing and walking. Eval Status: FOTO 32  FOTO score = an established functional score where 100 = no disability     2. Pt will have painfree forward flexion AROM WFL to aid in functional mechanics for ambulation/ADLs. Eval Status:   ROM % AROM Comments:pain, area   Forward flexion 40-60 To toes  Pain with right side,          3. Pt will have 5/5  right hip abduction strength to return to aid in functional mechanics for ambulation/ADLs. Raheel Nose Status:   Strength    Left(0-5) Right (0-5) N/T   Hip Abduction 5 3 \"resisted NT\" P! []          4. Patient will improve pain in right hip extension pain to 2/10 at worst to improve standing and walking tolerance and restore prior level of function. Eval Status: 10/10 at worst    Frequency / Duration: Patient to be seen 2 times per week for 12 treatments    Patient/ Caregiver education and instruction: Diagnosis, prognosis, self care, activity modification, and exercises     [x]  Plan of care has been reviewed with PTA    Certification Period: ASHKAN Gonzales, PT 3/16/2023 7:07 PM  ____________________________________________________________________  I certify that the above Therapy Services are being furnished while the patient is under my care.  I agree with the treatment plan and certify that this therapy is

## 2023-03-17 ENCOUNTER — HOSPITAL ENCOUNTER (OUTPATIENT)
Facility: HOSPITAL | Age: 58
Setting detail: RECURRING SERIES
Discharge: HOME | End: 2023-03-20
Payer: MEDICAID

## 2023-03-17 PROCEDURE — 97112 NEUROMUSCULAR REEDUCATION: CPT

## 2023-03-17 PROCEDURE — 97110 THERAPEUTIC EXERCISES: CPT

## 2023-03-17 PROCEDURE — 97530 THERAPEUTIC ACTIVITIES: CPT

## 2023-03-17 NOTE — PROGRESS NOTES
AM    Future Appointments   Date Time Provider Dank Douglas   3/17/2023  9:00 AM Alt Nishant 62, PT [facility] THE [facility]   3/20/2023 10:30 AM Alt Nishant 62, PT [place] [facility]

## 2023-03-20 ENCOUNTER — HOSPITAL ENCOUNTER (OUTPATIENT)
Facility: HOSPITAL | Age: 58
Setting detail: RECURRING SERIES
Discharge: HOME | End: 2023-03-23
Payer: MEDICAID

## 2023-03-20 PROCEDURE — 97112 NEUROMUSCULAR REEDUCATION: CPT

## 2023-03-20 PROCEDURE — 97530 THERAPEUTIC ACTIVITIES: CPT

## 2023-03-20 PROCEDURE — 97535 SELF CARE MNGMENT TRAINING: CPT

## 2023-03-20 PROCEDURE — 97110 THERAPEUTIC EXERCISES: CPT

## 2023-03-20 NOTE — PROGRESS NOTES
PHYSICAL / OCCUPATIONAL THERAPY - DAILY TREATMENT NOTE (updated )    Patient Name: Lazarus Funes    Date: 3/20/2023    : 1965  Insurance: Payor: Riya Guan / Plan: [address] / Product Type: *No Product type* /      Patient  verified Yes     Visit #   Current / Total 3 12   Time   In / Out 1040 1121   Pain   In / Out 8-9/10 low back; 4-5/10 groin pain 5-6/10 low back pain; 4-5/10 groin pain   Subjective Functional Status/Changes: Pt reports she is feeling a little better after taking two days worth of dose pack. Changes to:  Meds, Allergies, Med Hx, Sx Hx? If yes, update Summary List yes Pt went to see Primary Care Physician after last appt last Friday and she received another dose pack prescription and also received a prescription for Tramadol as well. TREATMENT AREA =  Pain in right hip [M25.551]    OBJECTIVE    Therapeutic Procedures: Tx Min Billable or 1:1 Min (if diff from Tx Min) Procedure, Rationale, Specifics    10 10 36500 Therapeutic Exercise (timed):  increase ROM, strength, coordination, balance, and proprioception to improve patient's ability to progress to PLOF and address remaining functional goals. (see flow sheet as applicable)     Details if applicable:       10 10 67034 Neuromuscular Re-Education (timed):  improve balance, coordination, kinesthetic sense, posture, core stability and proprioception to improve patient's ability to develop conscious control of individual muscles and awareness of position of extremities in order to progress to PLOF and address remaining functional goals. (see flow sheet as applicable)     Details if applicable:     10 10 07645 Therapeutic Activity (timed):  use of dynamic activities replicating functional movements to increase ROM, strength, coordination, balance, and proprioception in order to improve patient's ability to progress to PLOF and address remaining functional goals.   (see flow sheet as applicable)     Details if

## 2023-03-23 ENCOUNTER — HOSPITAL ENCOUNTER (OUTPATIENT)
Facility: HOSPITAL | Age: 58
Setting detail: RECURRING SERIES
Discharge: HOME | End: 2023-03-26
Payer: MEDICAID

## 2023-03-23 PROCEDURE — 97112 NEUROMUSCULAR REEDUCATION: CPT

## 2023-03-23 PROCEDURE — 97535 SELF CARE MNGMENT TRAINING: CPT

## 2023-03-23 NOTE — PROGRESS NOTES
bilateral hip flexion strength to return to restore prior level of function. Kimberly Lima Status: Strength    Left(0-5) Right (0-5) N/T   Hip Flexion (L1,2) 3+ 3+ P! []          Pt will be able to stand for more than 10 minutes to aid in functional mechanics for ambulation/ADLs. Eval Status: not able to stand for long periods of time     Long Term Goals: To be accomplished in 6 weeks:  Patient will improve FOTO score by 22 points to improve functional tolerance for improved standing and walking. Eval Status: FOTO 32  FOTO score = an established functional score where 100 = no disability     2. Pt will have painfree forward flexion AROM WFL to aid in functional mechanics for ambulation/ADLs. Eval Status:   ROM % AROM Comments:pain, area   Forward flexion 40-60 To toes  Pain with right side,          3. Pt will have 5/5  right hip abduction strength to return to aid in functional mechanics for ambulation/ADLs. Kimberly Lima Status:   Strength    Left(0-5) Right (0-5) N/T   Hip Abduction 5 3 \"resisted NT\" P! []          4. Patient will improve pain in right hip extension pain to 2/10 at worst to improve standing and walking tolerance and restore prior level of function.   Eval Status: 10/10 at worst         PLAN  Yes  Continue plan of care  []  Upgrade activities as tolerated  []  Discharge due to :  []  Other:    Ruth Sor    3/23/2023    11:04 AM    Future Appointments   Date Time Provider Dank Douglas   3/28/2023  5:30 PM Jarad Rowley PT [facility]   3/31/2023  8:30 AM Ladonna Momin PT [facility]   4/3/2023  8:30 AM [facility]   4/6/2023  9:30 AM Jarad Rowley PT [facility]   4/14/2023  3:30 PM [facility]

## 2023-03-28 ENCOUNTER — HOSPITAL ENCOUNTER (OUTPATIENT)
Facility: HOSPITAL | Age: 58
Setting detail: RECURRING SERIES
Discharge: HOME | End: 2023-03-31
Payer: MEDICAID

## 2023-03-28 PROCEDURE — 97530 THERAPEUTIC ACTIVITIES: CPT

## 2023-03-28 PROCEDURE — 97110 THERAPEUTIC EXERCISES: CPT

## 2023-03-28 PROCEDURE — 97112 NEUROMUSCULAR REEDUCATION: CPT

## 2023-03-28 NOTE — PROGRESS NOTES
units; 68-82 min = 5 units   Total Total     TOTAL TREATMENT TIME:        40     [x]  Patient Education billed concurrently with other procedures   [x] Review HEP    [] Progressed/Changed HEP, detail:    [] Other detail:       Objective Information/Functional Measures/Assessment    Patient continues to require cueing throughout exercises, though she does perform exercises well once cued. No increase in symptoms reported during PPT, PPT with bridge, or PPT with ball squeeze, though she did c/o increasing right lower back/right leg pain during right piriformis stretch so held on this. Decrease in symptoms noted after the performance of prone on elbows. Patient continued to have multiple questions about her course of care, which was explained again today. DPT advised patient that her symptoms are not very likely to heal quickly, and instead, may take several weeks for her to feel symptom relief, which is why she her evaluating therapist recommended a 6 week course of skilled PT. Patient will continue to benefit from skilled PT / OT services to modify and progress therapeutic interventions, analyze and address functional mobility deficits, analyze and address ROM deficits, analyze and address strength deficits, analyze and address soft tissue restrictions, analyze and cue for proper movement patterns, analyze and modify for postural abnormalities, and instruct in home and community integration to address functional deficits and attain remaining goals. Progress toward goals / Updated goals:  []  See Progress Note/Recertification    Short Term Goals: To be accomplished in 3 weeks:  Patient will be independent and compliant with HEP to progress toward goals and restore functional mobility. Eval Status: issued at eval  Current: HEP reissued today with only the PPT and knee fallout. 3/23/23     Patient will improve FOTO score by 11 points to improve functional tolerance for exercise.    Eval Status: Danoj 13

## 2023-03-31 ENCOUNTER — HOSPITAL ENCOUNTER (OUTPATIENT)
Facility: HOSPITAL | Age: 58
Setting detail: RECURRING SERIES
End: 2023-03-31
Payer: MEDICAID

## 2023-03-31 PROCEDURE — 97535 SELF CARE MNGMENT TRAINING: CPT

## 2023-03-31 PROCEDURE — 97112 NEUROMUSCULAR REEDUCATION: CPT

## 2023-03-31 PROCEDURE — 97530 THERAPEUTIC ACTIVITIES: CPT

## 2023-03-31 PROCEDURE — 97110 THERAPEUTIC EXERCISES: CPT

## 2023-03-31 NOTE — PROGRESS NOTES
PHYSICAL / OCCUPATIONAL THERAPY - DAILY TREATMENT NOTE (updated )    Patient Name: Sherry Whiting    Date: 3/31/2023    : 1965  Insurance: Payor: Krystle Cobb / [facility] / Product Type: *No Product type* /      Patient  verified Yes     Visit #   Current / Total 6 12   Time   In / Out 830 910   Pain   In / Out 7-8 7-8   Subjective Functional Status/Changes: Pt reported that she is still not sleeping and she is still wearing the SI belt. Changes to:  Meds, Allergies, Med Hx, Sx Hx? If yes, update Summary List no       TREATMENT AREA =  Pain in right hip [M25.551]    OBJECTIVE    Therapeutic Procedures: Tx Min Billable or 1:1 Min (if diff from Tx Min) Procedure, Rationale, Specifics   8 8 45765 Therapeutic Activity (timed):  use of dynamic activities replicating functional movements to increase ROM, strength, coordination, balance, and proprioception in order to improve patient's ability to progress to PLOF and address remaining functional goals. (see flow sheet as applicable)     Details if applicable:  bed mobility education for log rolling     24 24 98103 Neuromuscular Re-Education (timed):  improve balance, coordination, kinesthetic sense, posture, core stability and proprioception to improve patient's ability to develop conscious control of individual muscles and awareness of position of extremities in order to progress to PLOF and address remaining functional goals. (see flow sheet as applicable)     Details if applicable:     8  77755 Self Care/Home Management (timed):  improve patient knowledge and understanding of positioning  to improve patient's ability to progress to PLOF and address remaining functional goals.   (see flow sheet as applicable)     Details if applicable:  sleeping positions education   40 40 [facility] Totals Reminder: bill using total billable min of TIMED therapeutic procedures (example: do not include dry needle or estim unattended, both untimed codes, prior level of function. Eval Status: 20/10 at worst  Current: 7/10 today 3/31/23     Pt will have 5/5 bilateral hip flexion strength to return to restore prior level of function. Liliana Cisneros Status: Strength    Left(0-5) Right (0-5) N/T   Hip Flexion (L1,2) 3+ 3+ P! []          Pt will be able to stand for more than 10 minutes to aid in functional mechanics for ambulation/ADLs. Eval Status: not able to stand for long periods of time     Long Term Goals: To be accomplished in 6 weeks:  Patient will improve FOTO score by 22 points to improve functional tolerance for improved standing and walking. Eval Status: FOTO 32  FOTO score = an established functional score where 100 = no disability     2. Pt will have painfree forward flexion AROM WFL to aid in functional mechanics for ambulation/ADLs. Eval Status:   ROM % AROM Comments:pain, area   Forward flexion 40-60 To toes  Pain with right side,          3. Pt will have 5/5  right hip abduction strength to return to aid in functional mechanics for ambulation/ADLs. Liliana Cisneros Status:   Strength    Left(0-5) Right (0-5) N/T   Hip Abduction 5 3 \"resisted NT\" P! []          4. Patient will improve pain in right hip extension pain to 2/10 at worst to improve standing and walking tolerance and restore prior level of function.   Eval Status: 10/10 at worst     PLAN  Yes  Continue plan of care  []  Upgrade activities as tolerated  []  Discharge due to :  []  Other:    Janette Antoine PT    3/31/2023    8:24 AM    Future Appointments   Date Time Provider Dank Douglas   3/31/2023  8:30 AM Janette Antoine PT [facility]   4/3/2023  8:30 AM Tamie Momin PT [facility]   4/6/2023  9:30 AM Tamie Momin PT [facility]   4/14/2023  3:30 PM Tamie Momin PT [facility]

## 2023-04-03 ENCOUNTER — HOSPITAL ENCOUNTER (OUTPATIENT)
Facility: HOSPITAL | Age: 58
Setting detail: RECURRING SERIES
Discharge: HOME | End: 2023-04-06
Payer: MEDICAID

## 2023-04-03 PROCEDURE — 97112 NEUROMUSCULAR REEDUCATION: CPT

## 2023-04-03 PROCEDURE — 97530 THERAPEUTIC ACTIVITIES: CPT

## 2023-04-03 PROCEDURE — 97110 THERAPEUTIC EXERCISES: CPT

## 2023-04-03 PROCEDURE — 97535 SELF CARE MNGMENT TRAINING: CPT

## 2023-04-03 NOTE — PROGRESS NOTES
will continue to benefit from skilled PT / OT services to modify and progress therapeutic interventions, analyze and address functional mobility deficits, analyze and address ROM deficits, analyze and address strength deficits, analyze and address soft tissue restrictions, analyze and cue for proper movement patterns, analyze and modify for postural abnormalities, analyze and address imbalance/dizziness, and instruct in home and community integration to address functional deficits and attain remaining goals. Progress toward goals / Updated goals:  []  See Progress Note/Recertification    Short Term Goals: To be accomplished in 3 weeks:  Patient will be independent and compliant with HEP to progress toward goals and restore functional mobility. Eval Status: issued at eval  Current: HEP reissued today with only the PPT and knee fallout. 3/23/23     Patient will improve FOTO score by 11 points to improve functional tolerance for exercise. Eval Status: FOTO 32  FOTO score = an established functional score where 100 = no disability     Patient will improve pain in low back to 5/10  to improve standing and walking tolerance and restore prior level of function. Eval Status: 20/10 at worst  Current: 7/10 today 3/31/23     Pt will have 5/5 bilateral hip flexion strength to return to restore prior level of function. Tan Perkins Status: Strength    Left(0-5) Right (0-5) N/T   Hip Flexion (L1,2) 3+ 3+ P! []          Pt will be able to stand for more than 10 minutes to aid in functional mechanics for ambulation/ADLs. Eval Status: not able to stand for long periods of time     Long Term Goals: To be accomplished in 6 weeks:  Patient will improve FOTO score by 22 points to improve functional tolerance for improved standing and walking. Eval Status: FOTO 32  FOTO score = an established functional score where 100 = no disability     2.    Pt will have painfree forward flexion AROM WFL to aid in functional mechanics for

## 2023-04-14 ENCOUNTER — HOSPITAL ENCOUNTER (OUTPATIENT)
Facility: HOSPITAL | Age: 58
Setting detail: RECURRING SERIES
Discharge: HOME | End: 2023-04-17
Payer: MEDICAID

## 2023-04-14 PROCEDURE — 97112 NEUROMUSCULAR REEDUCATION: CPT

## 2023-04-14 PROCEDURE — 97535 SELF CARE MNGMENT TRAINING: CPT

## 2023-04-14 PROCEDURE — 97530 THERAPEUTIC ACTIVITIES: CPT

## 2023-04-14 PROCEDURE — 97110 THERAPEUTIC EXERCISES: CPT

## 2023-04-21 ENCOUNTER — HOSPITAL ENCOUNTER (OUTPATIENT)
Facility: HOSPITAL | Age: 58
Setting detail: RECURRING SERIES
Discharge: HOME | End: 2023-04-24
Payer: MEDICAID

## 2023-04-21 PROCEDURE — 97110 THERAPEUTIC EXERCISES: CPT

## 2023-04-21 PROCEDURE — 97112 NEUROMUSCULAR REEDUCATION: CPT

## 2023-04-21 PROCEDURE — 97530 THERAPEUTIC ACTIVITIES: CPT

## 2023-04-21 PROCEDURE — 97535 SELF CARE MNGMENT TRAINING: CPT

## 2023-05-12 ENCOUNTER — HOSPITAL ENCOUNTER (OUTPATIENT)
Facility: HOSPITAL | Age: 58
Setting detail: RECURRING SERIES
Discharge: HOME | End: 2023-05-15
Payer: MEDICAID

## 2023-05-12 PROCEDURE — 97535 SELF CARE MNGMENT TRAINING: CPT

## 2023-05-12 PROCEDURE — 97530 THERAPEUTIC ACTIVITIES: CPT

## 2023-05-12 PROCEDURE — 97112 NEUROMUSCULAR REEDUCATION: CPT

## 2023-05-12 PROCEDURE — 97110 THERAPEUTIC EXERCISES: CPT

## 2023-06-28 ENCOUNTER — TELEPHONE (OUTPATIENT)
Facility: HOSPITAL | Age: 58
End: 2023-06-28

## 2023-10-20 ENCOUNTER — TELEPHONE (OUTPATIENT)
Facility: HOSPITAL | Age: 58
End: 2023-10-20

## 2023-10-23 ENCOUNTER — HOSPITAL ENCOUNTER (OUTPATIENT)
Facility: HOSPITAL | Age: 58
Setting detail: RECURRING SERIES
Discharge: HOME | End: 2023-10-26
Payer: MEDICAID

## 2023-10-23 PROCEDURE — 97110 THERAPEUTIC EXERCISES: CPT

## 2023-10-23 PROCEDURE — 97530 THERAPEUTIC ACTIVITIES: CPT

## 2023-10-23 PROCEDURE — 97112 NEUROMUSCULAR REEDUCATION: CPT

## 2023-10-23 PROCEDURE — 97535 SELF CARE MNGMENT TRAINING: CPT

## 2023-10-23 NOTE — PROGRESS NOTES
Therapeutic Procedures: Tx Min Billable or 1:1 Min (if diff from Tx Min) Procedure, Rationale, Specifics   10  33561 Therapeutic Exercise (timed):  increase ROM, strength, coordination, balance, and proprioception to improve patient's ability to progress to PLOF and address remaining functional goals. (see flow sheet as applicable)    Details if applicable:       10  04119 Neuromuscular Re-Education (timed):  improve balance, coordination, kinesthetic sense, posture, core stability and proprioception to improve patient's ability to develop conscious control of individual muscles and awareness of position of extremities in order to progress to PLOF and address remaining functional goals. (see flow sheet as applicable)    Details if applicable:     10  19248 Therapeutic Activity (timed):  use of dynamic activities replicating functional movements to increase ROM, strength, coordination, balance, and proprioception in order to improve patient's ability to progress to PLOF and address remaining functional goals. (see flow sheet as applicable)     Details if applicable:     10  10246 Self Care/Home Management (timed):  improve patient knowledge and understanding of pain reducing techniques, posture/ergonomics, home safety, activity modification, diagnosis/prognosis, and physical therapy expectations, procedures and progression  to improve patient's ability to progress to PLOF and address remaining functional goals.   (see flow sheet as applicable)    Details if applicable:            Details if applicable:     36  [facility] Totals Reminder: bill using total billable min of TIMED therapeutic procedures (example: do not include dry needle or estim unattended, both untimed codes, in totals to left)  8-22 min = 1 unit; 23-37 min = 2 units; 38-52 min = 3 units; 53-67 min = 4 units; 68-82 min = 5 units   Total Total     TOTAL TREATMENT TIME:        40     [x]  Patient Education billed concurrently with other procedures   [x]

## 2023-10-25 ENCOUNTER — TELEPHONE (OUTPATIENT)
Facility: HOSPITAL | Age: 58
End: 2023-10-25

## 2023-10-25 ENCOUNTER — APPOINTMENT (OUTPATIENT)
Facility: HOSPITAL | Age: 58
End: 2023-10-25
Payer: MEDICAID

## 2023-10-27 ENCOUNTER — HOSPITAL ENCOUNTER (OUTPATIENT)
Facility: HOSPITAL | Age: 58
Setting detail: RECURRING SERIES
Discharge: HOME | End: 2023-10-30
Payer: MEDICAID

## 2023-10-27 PROCEDURE — 97112 NEUROMUSCULAR REEDUCATION: CPT

## 2023-10-27 PROCEDURE — 97535 SELF CARE MNGMENT TRAINING: CPT

## 2023-10-27 PROCEDURE — 97110 THERAPEUTIC EXERCISES: CPT

## 2023-10-27 NOTE — PROGRESS NOTES
PHYSICAL / OCCUPATIONAL THERAPY - DAILY TREATMENT NOTE (updated )    Patient Name: Jerel Murillo    Date: 10/27/2023    : 1965  Insurance: Payor: Solmon Raw / Plan: Britni Crenshaw / Product Type: *No Product type* /      Patient  verified Yes     Visit #   Current / Total 3 16   Time   In / Out 9:17 956   Pain   In / Out 10 8   Subjective Functional Status/Changes: Feeling in horrible pain, something is not right. They  had to reschedule her ortho f/u for the . Changes to: Allergies, Med Hx, Sx Hx?   no       TREATMENT AREA =  Right knee pain [M25.561]    OBJECTIVE    Therapeutic Procedures: Tx Min Billable or 1:1 Min (if diff from Tx Min) Procedure, Rationale, Specifics   15 15 [id number] Neuromuscular Re-Education (timed):  improve balance, coordination, kinesthetic sense, posture, core stability and proprioception to improve patient's ability to develop conscious control of individual muscles and awareness of position of extremities in order to progress to PLOF and address remaining functional goals. (see flow sheet as applicable)    Details if applicable:    Mobilization with movement talocrural AP mobilization with ankle dorsiflexion in calcaneous neutral x30 then with talocrural distraction x30. Superior posterior mobilization of lateral maleolus with ankle dorsiflexion x30    KT tape to facilitate neutral foot position, to support arch. Pt education on tape use and how and when to remove   9 9 40232 Therapeutic Exercise (timed):  increase ROM, strength, coordination, balance, and proprioception to improve patient's ability to progress to PLOF and address remaining functional goals.  (see flow sheet as applicable)    Details if applicable:     15 15 01901 Self Care/Home Management (timed):  improve patient knowledge and understanding of pain reducing techniques, positioning, posture/ergonomics, activity modification, diagnosis/prognosis, and physical therapy expectations,

## 2023-10-30 ENCOUNTER — HOSPITAL ENCOUNTER (OUTPATIENT)
Facility: HOSPITAL | Age: 58
Setting detail: RECURRING SERIES
Discharge: HOME | End: 2023-11-02
Payer: MEDICAID

## 2023-10-30 PROCEDURE — 97110 THERAPEUTIC EXERCISES: CPT

## 2023-10-30 PROCEDURE — 97530 THERAPEUTIC ACTIVITIES: CPT

## 2023-10-30 PROCEDURE — 97016 VASOPNEUMATIC DEVICE THERAPY: CPT

## 2023-10-30 PROCEDURE — 97112 NEUROMUSCULAR REEDUCATION: CPT

## 2023-10-30 NOTE — PROGRESS NOTES
Procedures: Tx Min Billable or 1:1 Min (if diff from Tx Min) Procedure, Rationale, Specifics   11  49604 Therapeutic Exercise (timed):  increase ROM, strength, coordination, balance, and proprioception to improve patient's ability to progress to PLOF and address remaining functional goals. (see flow sheet as applicable)    Details if applicable:       18  82097 Therapeutic Activity (timed):  use of dynamic activities replicating functional movements to increase ROM, strength, coordination, balance, and proprioception in order to improve patient's ability to progress to PLOF and address remaining functional goals. (see flow sheet as applicable)    Details if applicable:     10  43509 Neuromuscular Re-Education (timed):  improve balance, coordination, kinesthetic sense, posture, core stability and proprioception to improve patient's ability to develop conscious control of individual muscles and awareness of position of extremities in order to progress to PLOF and address remaining functional goals. (see flow sheet as applicable)     Details if applicable:     44  [facility] Totals Reminder: bill using total billable min of TIMED therapeutic procedures (example: do not include dry needle or estim unattended, both untimed codes, in totals to left)  8-22 min = 1 unit; 23-37 min = 2 units; 38-52 min = 3 units; 53-67 min = 4 units; 68-82 min = 5 units   Total Total     TOTAL TREATMENT TIME:        49     [x]  Patient Education billed concurrently with other procedures   [x] Review HEP    [x] Progressed/Changed HEP, detail:  Access Code: [email or web address]  URL: [email or web address]  Date: 10/30/2023  Prepared by: Marychuy    Exercises  - Standing Quad Set  - 1 x daily - 7 x weekly - 2 sets - 10 reps - 5 secs hold  - Mini Squat  - 1 x daily - 7 x weekly - 2 sets - 10 reps  - Active Straight Leg Raise with Quad Set  - 1 x daily - 7 x weekly - 2 sets - 10 reps  - Sidelying Hip Abduction  - 1 x daily - 7 x weekly - 2 sets - 10

## 2023-11-01 ENCOUNTER — HOSPITAL ENCOUNTER (OUTPATIENT)
Facility: HOSPITAL | Age: 58
Setting detail: RECURRING SERIES
Discharge: HOME | End: 2023-11-04
Payer: MEDICAID

## 2023-11-01 PROCEDURE — 97112 NEUROMUSCULAR REEDUCATION: CPT

## 2023-11-01 PROCEDURE — 97110 THERAPEUTIC EXERCISES: CPT

## 2023-11-01 PROCEDURE — 97530 THERAPEUTIC ACTIVITIES: CPT

## 2023-11-01 PROCEDURE — 97535 SELF CARE MNGMENT TRAINING: CPT

## 2023-11-01 NOTE — PROGRESS NOTES
continued therapy to address unmet goals and to return to prior level of activity. Patient will continue to benefit from skilled PT / OT services to modify and progress therapeutic interventions, analyze and address functional mobility deficits, analyze and address ROM deficits, analyze and address strength deficits, analyze and address soft tissue restrictions, analyze and cue for proper movement patterns, and analyze and modify for postural abnormalities to address functional deficits and attain remaining goals. Progress toward goals / Updated goals:  []  See Progress Note/Recertification    Short Term Goals:    to be accomplished within 8 treatments:     Patient will be compliant and independent with prescribed HEP(s) in order to assist in maximizing therapeutic gains and improving overall function. Eval: HEP to be issued and reviewed with patient within 1-2 visits  Current: HEP issued and reviewed with patient    10/30/23     Patient will report at least a 25% reduction in pain/symptoms while performing functional activities in order to improve overall tolerance to functional movements and progress towards PLOF. Eval: 10/10 pain at worst, but an average daily pain of 9/10  Current: 10/10 pain at worst    10/30/23, NO CHANGE     Patient will demonstrate 0-135 degrees of right knee AROM to improve to normal mobility during ambulation. Eval: AROM: 0-110 degs    Current:     Long Term Goals:     to be accomplished within 16 treatments:     Patient will score at least 45 points on FOTO in order to maximize function and promote patient satisfaction with overall outcome. (Bert Monas is an established functional score where a score of 100 = no disability)  Eval: 18    Current:     Patient will report an average daily pain of 2/10 or less during all functional activities in order to improve QOL and return to patient's PLOF.   Eval: 10/10 pain at worst, but an average daily pain of 9/10    Current: 9/10 daily

## 2023-11-06 ENCOUNTER — HOSPITAL ENCOUNTER (OUTPATIENT)
Facility: HOSPITAL | Age: 58
Setting detail: RECURRING SERIES
Discharge: HOME | End: 2023-11-09
Payer: COMMERCIAL

## 2023-11-06 PROCEDURE — 97535 SELF CARE MNGMENT TRAINING: CPT

## 2023-11-06 PROCEDURE — 97530 THERAPEUTIC ACTIVITIES: CPT

## 2023-11-06 PROCEDURE — 97112 NEUROMUSCULAR REEDUCATION: CPT

## 2023-11-06 PROCEDURE — 97110 THERAPEUTIC EXERCISES: CPT

## 2023-11-06 NOTE — PROGRESS NOTES
postural abnormalities, and instruct in home and community integration to address functional deficits and attain remaining goals. Progress toward goals / Updated goals:  []  See Progress Note/Recertification    Short Term Goals:    to be accomplished within 8 treatments:     Patient will be compliant and independent with prescribed HEP(s) in order to assist in maximizing therapeutic gains and improving overall function. Eval: HEP to be issued and reviewed with patient within 1-2 visits  Current: Patient reports daily compliance with her HEP    11/6/23, MET     Patient will report at least a 25% reduction in pain/symptoms while performing functional activities in order to improve overall tolerance to functional movements and progress towards PLOF. Eval: 10/10 pain at worst, but an average daily pain of 9/10  Current: 10/10 pain at worst    10/30/23, NO CHANGE     Patient will demonstrate 0-135 degrees of right knee AROM to improve to normal mobility during ambulation. Eval: AROM: 0-110 degs              Current: WNL    11/6/23, MET     Long Term Goals:     to be accomplished within 16 treatments:     Patient will score at least 45 points on FOTO in order to maximize function and promote patient satisfaction with overall outcome. (Narcisa Barnacle is an established functional score where a score of 100 = no disability)  Eval: 18              Current: 36    11/6/23, MET     Patient will report an average daily pain of 2/10 or less during all functional activities in order to improve QOL and return to patient's PLOF. Eval: 10/10 pain at worst, but an average daily pain of 9/10              Current: 10/10 pain at worst in the last week   11/1/23, NO CHANGE     Patient will demonstrate 5/5 strength bilaterally in order to be able to safely perform functional activities and demonstrate improved stability and strength.   Eval: Grossly 5/5 bilaterally except right knee extension 4/5, right knee flexion 4+/5 (hip abduction and

## 2023-11-08 ENCOUNTER — APPOINTMENT (OUTPATIENT)
Facility: HOSPITAL | Age: 58
End: 2023-11-08
Payer: COMMERCIAL

## 2023-11-13 ENCOUNTER — APPOINTMENT (OUTPATIENT)
Facility: HOSPITAL | Age: 58
End: 2023-11-13
Payer: COMMERCIAL

## 2023-11-15 ENCOUNTER — APPOINTMENT (OUTPATIENT)
Facility: HOSPITAL | Age: 58
End: 2023-11-15
Payer: COMMERCIAL

## 2023-11-20 ENCOUNTER — HOSPITAL ENCOUNTER (OUTPATIENT)
Facility: HOSPITAL | Age: 58
Setting detail: RECURRING SERIES
Discharge: HOME | End: 2023-11-23
Payer: COMMERCIAL

## 2023-11-20 PROCEDURE — 97112 NEUROMUSCULAR REEDUCATION: CPT

## 2023-11-20 PROCEDURE — 97530 THERAPEUTIC ACTIVITIES: CPT

## 2023-11-20 PROCEDURE — 97535 SELF CARE MNGMENT TRAINING: CPT

## 2023-11-20 PROCEDURE — 97110 THERAPEUTIC EXERCISES: CPT

## 2023-11-20 NOTE — PROGRESS NOTES
PHYSICAL / OCCUPATIONAL THERAPY - DAILY TREATMENT NOTE (updated )    Patient Name: Rashid Rush    Date: 2023    : 1965  Insurance: Payor: Wally Terrazas / Plan: [employer] / Product Type: *No Product type* /      Patient  verified Yes     Visit #   Current / Total 7 16   Time   In / Out 840 930   Pain   In / Out 8 8   Subjective Functional Status/Changes: Pt reported that she felt like the KT tape help the most and she feels like exercise is not helping   Changes to: Allergies, Med Hx, Sx Hx?   no       TREATMENT AREA =  Right knee pain [M25.561]    OBJECTIVE  Therapeutic Procedures: Tx Min Billable or 1:1 Min (if diff from Tx Min) Procedure, Rationale, Specifics   8 8 [id number] Therapeutic Exercise (timed):  increase ROM, strength, coordination, balance, and proprioception to improve patient's ability to progress to PLOF and address remaining functional goals. (see flow sheet as applicable)    Details if applicable:       10 10 94673 Therapeutic Activity (timed):  use of dynamic activities replicating functional movements to increase ROM, strength, coordination, balance, and proprioception in order to improve patient's ability to progress to PLOF and address remaining functional goals. (see flow sheet as applicable)    Details if applicable:      59452 Self Care/Home Management (timed):  improve patient knowledge and understanding of activity modification, diagnosis/prognosis, and physical therapy expectations, procedures and progression  to improve patient's ability to progress to PLOF and address remaining functional goals.   (see flow sheet as applicable)     Details if applicable:     8  68503 Neuromuscular Re-Education (timed):  improve balance, coordination, kinesthetic sense, posture, core stability and proprioception to improve patient's ability to develop conscious control of individual muscles and awareness of position of extremities in order to progress to PLOF and address

## 2023-11-20 NOTE — PROGRESS NOTES
[facility] at [address] Deette Gilford, [address] [facility]  Ph [phone]  Fx [phone]    Physical Therapy Progress Note  Patient name: Heather Pryor Start of Care: 10/20/2023   Referral source: David Sepulved[place], [place] : 1965               Medical Diagnosis: Right knee pain [M25.561]    Onset Date:23               Treatment Diagnosis: M25.561  RIGHT KNEE PAIN      Prior Hospitalization: see medical history Provider#: [id number]   Medications: Verified on Patient summary List   Comorbidities: chronic right knee pain x 5 years, s/p right knee scope 23, chronic LBP, chronic lumbar radiculopathy, chronic right hip pain, s/p tibial fracture in  with ORIF and subsequent hardware removal, pacemaker, concussion  Substance Use: [] tobacco use, [] alcohol use, [] other:   Patients Self-Rated Overall Health Status: [] poor, [] fair, [x] good, [] excellent  Number of Falls Within the Past Year: [] none, [x] 5  Prior Level of Function:   [x] Unrestricted with functional activities and ADL's  [x] No assistive device  [] active lifestyle, [x] moderately active lifestyle, [] sedentary lifestyle  Patients Goals:  \"I want to get rid of the pain and move freely. \"    Visits from Start of Care: 7    Missed Visits: 1    Updated Goals/Measure of Progress: To be achieved in 16 treatments:    Short Term Goals:    to be accomplished within 8 treatments:     Patient will be compliant and independent with prescribed HEP(s) in order to assist in maximizing therapeutic gains and improving overall function. Eval: HEP to be issued and reviewed with patient within 1-2 visits  Current: Patient reports daily compliance with her HEP    23, MET  23: Reported very little compliance      Patient will report at least a 25% reduction in pain/symptoms while performing functional activities in order to improve overall tolerance to functional movements and progress towards PLOF.   Eval: 10/10 pain at

## 2023-11-22 ENCOUNTER — APPOINTMENT (OUTPATIENT)
Facility: HOSPITAL | Age: 58
End: 2023-11-22
Payer: COMMERCIAL

## 2023-11-27 ENCOUNTER — APPOINTMENT (OUTPATIENT)
Facility: HOSPITAL | Age: 58
End: 2023-11-27
Payer: COMMERCIAL

## 2025-03-07 ENCOUNTER — HOSPITAL ENCOUNTER (OUTPATIENT)
Facility: HOSPITAL | Age: 60
Setting detail: RECURRING SERIES
Discharge: HOME | End: 2025-03-10
Payer: COMMERCIAL

## 2025-03-07 PROCEDURE — 97161 PT EVAL LOW COMPLEX 20 MIN: CPT

## 2025-03-07 NOTE — PROGRESS NOTES
PHYSICAL THERAPY EVALUATION / DAILY TREATMENT      Patient Name: Juan Woodson    Date: 3/7/2025    : 1965  Insurance: Payor: JOSEE / [facility] / Product Type: *No Product type* /      Patient  verified yes     Visit #   Current / Total 1 8   Time   In / Out 1:25 2:02   Pain   In / Out 7 7     TREATMENT AREA =  Pain in right knee [M25.561]    If an interpreting service is utilized for treatment of this patient, the contents of this document represent the material reviewed with the patient via the .     SUBJECTIVE:  Chief Complaint/Mechanism of Injury:   Patient is a pleasant 59 y.o. female s/p right knee arthroscopic medial meniscal debridement on 3/4/25 by Dr. Andrade.  Patient reports having a similar surgery to same knee about 18 months ago.   Patient states she had a couple of falls after her first surgery that caused her to injure her right knee again, thus why she elected to have surgery again.  Patient states her pain after this most recent surgery has been on the higher levels, though she is managing pain with Motrin.  Patient states she is also on a baby aspirin daily for DVT prevention.  Patient is with c/o pain and/or difficulty with generalized ROM, stair negotiations (she does step-through but with having to pull herself with handrails), sit <> stands, squatting, and prolonged walking > 30 minutes.  Patient states she discontinued use of AD as of yesterday.  Patient works as a , which involves walking/stepping over unlevel terrains.  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  Patient's usual hobbies include walking for exercise, dancing, and playing ball with her grand kids.  Patient states she hasn't been able to dance or play with her grand kids in years secondary to chronic right knee pain.    Pain Level (out of 0-10 scale): 
   Scoring:           MCID = 9 points                                              21-40 = moderate limitation                                      61-80 = minimal to no limitation                     0-20 = severe limitation                                      41-60 = mild to moderate limitation  Eval: 20/80 on LEFS      Patient will report an average daily pain of 2/10 or less during all functional activities in order to improve QOL and return to patient's PLOF.  Eval: 10/10 pain at worst, but an average daily pain of 7/10     Patient will be able to ambulate for at least 60 minutes with normal gait mechanics to improve her tolerance to prolonged walking during essential errands and community walking.  Eval: patient hasn't yet tried walking > 30 minutes since having surgery 3 days ago      Patient will be able to safely negotiate a full flight of stairs with a step-through pattern while holding onto one handrail (but not pulling herself up) in order to return to improve safety on stairs.  Eval: step-through but with having to pull herself with handrails      Patient will be able to perform at least 16 reps of sit <> stands with good form/control during 30\" STS test to demonstrate improved LE strength and stability, thus reducing the patients risk of falls.  Eval: 14 reps with good control/form     Frequency / Duration: Patient to be seen 2 times per week for 8 treatments    Patient/ Caregiver education and instruction: Diagnosis, prognosis, self care, activity modification, postural/core strengthening education, knee ROM education, ice/elevation to decrease pain and inflammation    [x]  Plan of care has been reviewed with PTA    Certification Period: n/a  Rayna Perrin, PT, DPT, ATR    3/7/2025   8:41 AM   ____________________________________________________________________  I certify that the above Therapy Services are being furnished while the patient is under my care. I agree with the treatment plan and

## 2025-03-25 ENCOUNTER — HOSPITAL ENCOUNTER (OUTPATIENT)
Facility: HOSPITAL | Age: 60
Setting detail: RECURRING SERIES
Discharge: HOME | End: 2025-03-28
Payer: COMMERCIAL

## 2025-03-25 PROCEDURE — 97530 THERAPEUTIC ACTIVITIES: CPT

## 2025-03-25 PROCEDURE — 97110 THERAPEUTIC EXERCISES: CPT

## 2025-03-25 PROCEDURE — 97112 NEUROMUSCULAR REEDUCATION: CPT

## 2025-03-25 NOTE — PROGRESS NOTES
PHYSICAL / OCCUPATIONAL THERAPY - DAILY TREATMENT NOTE     Patient Name: Juan Woodson    Date: 3/25/2025    : 1965  Insurance: Payor: JOSEE / Plan: JOSEE [employer]ENT HMO / Product Type: *No Product type* /      Patient  verified Yes     Visit #   Current / Total 2 8   Time   In / Out 11:10 12:00   Pain   In / Out 4/10 6/10   Subjective Functional Status/Changes: Pt reported 4/10 right knee pain upon arrival to clinic   Changes to:  Allergies, Med Hx, Sx Hx?   no       TREATMENT AREA =  Pain in right knee [M25.561]  Pain in right knee [M25.561]    If an interpreting service is utilized for treatment of this patient, the contents of this document represent the material reviewed with the patient via the .     OBJECTIVE      Therapeutic Procedures:  Tx Min Billable or 1:1 Min (if diff from Tx Min) Procedure, Rationale, Specifics   20  99886 Therapeutic Exercise (timed):  increase ROM, strength, coordination, balance, and proprioception to improve patient's ability to progress to PLOF and address remaining functional goals. (see flow sheet as applicable)    Details if applicable:       15  96492 Neuromuscular Re-Education (timed):  improve balance, coordination, kinesthetic sense, posture, core stability and proprioception to improve patient's ability to develop conscious control of individual muscles and awareness of position of extremities in order to progress to PLOF and address remaining functional goals. (see flow sheet as applicable)    Details if applicable:     15  20622 Therapeutic Activity (timed):  use of dynamic activities replicating functional movements to increase ROM, strength, coordination, balance, and proprioception in order to improve patient's ability to progress to PLOF and address remaining functional goals.  (see flow sheet as applicable)     Details if applicable:           Details if applicable:            Details if applicable:     50  [facility] Totals Reminder: bill using

## 2025-03-28 ENCOUNTER — TELEPHONE (OUTPATIENT)
Facility: HOSPITAL | Age: 60
End: 2025-03-28

## 2025-03-28 ENCOUNTER — HOSPITAL ENCOUNTER (OUTPATIENT)
Facility: HOSPITAL | Age: 60
Setting detail: RECURRING SERIES
Discharge: HOME | End: 2025-03-31
Payer: COMMERCIAL

## 2025-03-28 PROCEDURE — 97110 THERAPEUTIC EXERCISES: CPT

## 2025-03-28 PROCEDURE — 97530 THERAPEUTIC ACTIVITIES: CPT

## 2025-03-28 PROCEDURE — 97112 NEUROMUSCULAR REEDUCATION: CPT

## 2025-03-28 NOTE — PROGRESS NOTES
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   no       TREATMENT AREA =  Pain in right knee [M25.561]  Pain in right knee [M25.561]    If an interpreting service is utilized for treatment of this patient, the contents of this document represent the material reviewed with the patient via the .     OBJECTIVE    Therapeutic Procedures:  Tx Min Billable or 1:1 Min (if diff from Tx Min) Procedure, Rationale, Specifics   10  99763 Therapeutic Exercise (timed):  increase ROM, strength, coordination, balance, and proprioception to improve patient's ability to progress to PLOF and address remaining functional goals. (see flow sheet as applicable)    Details if applicable:       16  15462 Neuromuscular Re-Education (timed):  improve balance, coordination, kinesthetic sense, posture, core stability and proprioception to improve patient's ability to develop conscious control of individual muscles and awareness of position of extremities in order to progress to PLOF and address remaining functional goals. (see flow sheet as applicable)    Details if applicable:     28  84682 Therapeutic Activity (timed):  use of dynamic activities replicating functional movements to increase ROM, strength, coordination, balance, and proprioception in order to improve patient's ability to progress to PLOF and address remaining functional goals.  (see flow sheet as applicable)     Details if applicable:     54  [facility] Totals Reminder: bill using total billable min of TIMED therapeutic procedures

## 2025-03-31 ENCOUNTER — HOSPITAL ENCOUNTER (OUTPATIENT)
Facility: HOSPITAL | Age: 60
Setting detail: RECURRING SERIES
Discharge: HOME | End: 2025-04-03
Payer: COMMERCIAL

## 2025-03-31 PROCEDURE — 97112 NEUROMUSCULAR REEDUCATION: CPT

## 2025-03-31 PROCEDURE — 97110 THERAPEUTIC EXERCISES: CPT

## 2025-03-31 PROCEDURE — 97530 THERAPEUTIC ACTIVITIES: CPT

## 2025-03-31 NOTE — PROGRESS NOTES
PHYSICAL / OCCUPATIONAL THERAPY - DAILY TREATMENT NOTE     Patient Name: Juan Woodson    Date: 3/31/2025    : 1965  Insurance: Payor: JOSEE / Plan: JOSEE TRENT HMO / Product Type: *No Product type* /      Patient  verified Yes     Visit #   Current / Total 4 8   Time   In / Out 2:50 4:33   Pain   In / Out 3 2   Subjective Functional Status/Changes: Patient states most of the time she doesn't have any pain, but on occasion she does have pain up to 2-3/10 pain level.   Changes to:  Allergies, Med Hx, Sx Hx?   no       TREATMENT AREA =  Pain in right knee [M25.561]  Pain in right knee [M25.561]    If an interpreting service is utilized for treatment of this patient, the contents of this document represent the material reviewed with the patient via the .     OBJECTIVE    Therapeutic Procedures:  Tx Min Billable or 1:1 Min (if diff from Tx Min) Procedure, Rationale, Specifics   8  65864 Therapeutic Exercise (timed):  increase ROM, strength, coordination, balance, and proprioception to improve patient's ability to progress to PLOF and address remaining functional goals. (see flow sheet as applicable)    Details if applicable:       25  71652 Therapeutic Activity (timed):  use of dynamic activities replicating functional movements to increase ROM, strength, coordination, balance, and proprioception in order to improve patient's ability to progress to PLOF and address remaining functional goals.  (see flow sheet as applicable)    Details if applicable:     10  67863 Neuromuscular Re-Education (timed):  improve balance, coordination, kinesthetic sense, posture, core stability and proprioception to improve patient's ability to develop conscious control of individual muscles and awareness of position of extremities in order to progress to PLOF and address remaining functional goals. (see flow sheet as applicable)     Details if applicable:     43  [facility] Totals Reminder: bill using total billable

## 2025-04-03 ENCOUNTER — HOSPITAL ENCOUNTER (OUTPATIENT)
Facility: HOSPITAL | Age: 60
Setting detail: RECURRING SERIES
Discharge: HOME | End: 2025-04-06
Payer: COMMERCIAL

## 2025-04-03 PROCEDURE — 97110 THERAPEUTIC EXERCISES: CPT

## 2025-04-03 PROCEDURE — 97530 THERAPEUTIC ACTIVITIES: CPT

## 2025-04-03 PROCEDURE — 97112 NEUROMUSCULAR REEDUCATION: CPT

## 2025-04-03 NOTE — PROGRESS NOTES
PHYSICAL / OCCUPATIONAL THERAPY - DAILY TREATMENT NOTE     Patient Name: Juan Woodson    Date: 4/3/2025    : 1965  Insurance: Payor: JOSEE / Plan: JOSEE [employer]ENT HMO / Product Type: *No Product type* /      Patient  verified Yes     Visit #   Current / Total 5 8   Time   In / Out 11:11 12:16   Pain   In / Out 2/10 5/10   Subjective Functional Status/Changes: \"My ankles have been bothering me\"   Changes to:  Allergies, Med Hx, Sx Hx?   no       TREATMENT AREA =  Pain in right knee [M25.561]  Pain in right knee [M25.561]    If an interpreting service is utilized for treatment of this patient, the contents of this document represent the material reviewed with the patient via the .     OBJECTIVE    Therapeutic Procedures:  Tx Min Billable or 1:1 Min (if diff from Tx Min) Procedure, Rationale, Specifics   19  36354 Therapeutic Exercise (timed):  increase ROM, strength, coordination, balance, and proprioception to improve patient's ability to progress to PLOF and address remaining functional goals. (see flow sheet as applicable)    Details if applicable:        Neuromuscular Re-Education (timed):  improve balance, coordination, kinesthetic sense, posture, core stability and proprioception to improve patient's ability to develop conscious control of individual muscles and awareness of position of extremities in order to progress to PLOF and address remaining functional goals. (see flow sheet as applicable)    Details if applicable:      10847 Therapeutic Activity (timed):  use of dynamic activities replicating functional movements to increase ROM, strength, coordination, balance, and proprioception in order to improve patient's ability to progress to PLOF and address remaining functional goals.  (see flow sheet as applicable)     Details if applicable:     65 44 [facility] Totals Reminder: bill using total billable min of TIMED therapeutic procedures (example: do not include dry needle

## 2025-04-03 NOTE — PROGRESS NOTES
[facility]  [address] [place] [address]  Ph [phone]  Fx [phone]    Physical Therapy Progress Note  Patient name: Juan Woodson Start of Care: 3/7/2025   Referral source: Anthony Andrade MD : 1965               Medical Diagnosis: Pain in right knee [M25.561] Onset Date:3/4/25               Treatment Diagnosis: M25.561  RIGHT KNEE PAIN      Prior Hospitalization: see medical history Provider#: [id number]      Comorbidities: OA, chronic right knee pain including surgery in  as well as 3/7/25, epilepsy, fibromyalgia, pacemaker,   Prior Level of Function:Prior Level of Function:   [] Unrestricted with functional activities and ADL's  [x] No assistive device  [] active lifestyle, [x] moderately active lifestyle, [] sedentary lifestyle    Reporting Period: 3/7/2025-4/3/2025    Visits from Start of Care: 5    Missed Visits: 0    Updated Goals/Measure of Progress: To be achieved in 8 treatments:    Short Term Goals:    to be accomplished within 4 treatments:      Patient will be compliant and independent with prescribed HEP(s) in order to assist in maximizing therapeutic gains and improving overall function.  Eval: HEP to be issued and reviewed with patient within 1-2 visits  PN 4/3/2025: patient reports completing HEP 1-2 times/week.   REGRESSION      Patient will report at least a 25% reduction in pain/symptoms while performing functional activities in order to improve overall tolerance to functional movements and progress towards PLOF.  Eval: 10/10 pain at worst, but an average daily pain of 7/10  Current: 3/10 pain at the worst in the last week, 0/10 average daily pain   3/31/2025, MET      Patient will demonstrate 0-135 degrees of right AROM to improve overall right knee mobility during ambulation and sit <> stands.  Eval: Right Knee AROM: 0-127 degs, mild pain with flexion  Current: Right Knee AROM: 0-135 degrees noted during TRX squats (which were painfree)

## 2025-04-04 ENCOUNTER — TELEPHONE (OUTPATIENT)
Facility: HOSPITAL | Age: 60
End: 2025-04-04

## 2025-04-07 ENCOUNTER — HOSPITAL ENCOUNTER (OUTPATIENT)
Facility: HOSPITAL | Age: 60
Setting detail: RECURRING SERIES
Discharge: HOME | End: 2025-04-10
Payer: COMMERCIAL

## 2025-04-07 PROCEDURE — 97530 THERAPEUTIC ACTIVITIES: CPT

## 2025-04-07 PROCEDURE — 97112 NEUROMUSCULAR REEDUCATION: CPT

## 2025-04-07 PROCEDURE — 97110 THERAPEUTIC EXERCISES: CPT

## 2025-04-07 NOTE — PROGRESS NOTES
PHYSICAL / OCCUPATIONAL THERAPY - DAILY TREATMENT NOTE     Patient Name: Juan MCDERMOTT Field    Date: 2025    : 1965  Insurance: Payor: JOSEE / Plan: JOSEE TRENT HMO / Product Type: *No Product type* /      Patient  verified Yes     Visit #   Current / Total 6 8   Time   In / Out 1110 1155   Pain   In / Out 4/10 4/10   Subjective Functional Status/Changes: Pt reported that her knee was bothering her today but dont blame it on the weather    Changes to:  Allergies, Med Hx, Sx Hx?   no       TREATMENT AREA =  Pain in right knee [M25.561]  Pain in right knee [M25.561]    If an interpreting service is utilized for treatment of this patient, the contents of this document represent the material reviewed with the patient via the .     OBJECTIVE    Therapeutic Procedures:  Tx Min Billable or 1:1 Min (if diff from Tx Min) Procedure, Rationale, Specifics   10  54113 Therapeutic Exercise (timed):  increase ROM, strength, coordination, balance, and proprioception to improve patient's ability to progress to PLOF and address remaining functional goals. (see flow sheet as applicable)    Details if applicable:       25  63305 Neuromuscular Re-Education (timed):  improve balance, coordination, kinesthetic sense, posture, core stability and proprioception to improve patient's ability to develop conscious control of individual muscles and awareness of position of extremities in order to progress to PLOF and address remaining functional goals. (see flow sheet as applicable)    Details if applicable:     10  77786 Therapeutic Activity (timed):  use of dynamic activities replicating functional movements to increase ROM, strength, coordination, balance, and proprioception in order to improve patient's ability to progress to PLOF and address remaining functional goals.  (see flow sheet as applicable)     Details if applicable:     45  [facility] Totals Reminder: bill using total billable min of TIMED therapeutic

## 2025-04-10 ENCOUNTER — HOSPITAL ENCOUNTER (OUTPATIENT)
Facility: HOSPITAL | Age: 60
Setting detail: RECURRING SERIES
Discharge: HOME | End: 2025-04-13
Payer: COMMERCIAL

## 2025-04-10 PROCEDURE — 97112 NEUROMUSCULAR REEDUCATION: CPT

## 2025-04-10 PROCEDURE — 97530 THERAPEUTIC ACTIVITIES: CPT

## 2025-04-10 PROCEDURE — 97110 THERAPEUTIC EXERCISES: CPT

## 2025-04-10 NOTE — PROGRESS NOTES
PHYSICAL / OCCUPATIONAL THERAPY - DAILY TREATMENT NOTE     Patient Name: Juan Woodson    Date: 4/10/2025    : 1965  Insurance: Payor: JOSEE / Plan: JOSEE TRENT HMO / Product Type: *No Product type* /      Patient  verified Yes     Visit #   Current / Total 7 8   Time   In / Out *** ***   Pain   In / Out *** ***   Subjective Functional Status/Changes: ***   Changes to:  Allergies, Med Hx, Sx Hx?   no       TREATMENT AREA =  Pain in right knee [M25.561]  Pain in right knee [M25.561]    If an interpreting service is utilized for treatment of this patient, the contents of this document represent the material reviewed with the patient via the .     OBJECTIVE    Modalities Rationale:     decrease edema, decrease inflammation, and decrease pain to improve patient's ability to progress to PLOF and address remaining functional goals.     min [] Estim Unattended, type/location:                                      []  w/ice    []  w/heat    min [] Estim Attended, type/location:                                     []  w/US     []  w/ice    []  w/heat    []  TENS insruct      min []  Mechanical Traction: type/lbs                   []  pro   []  sup   []  int   []  cont    []  before manual    []  after manual    min []  Ultrasound, settings/location:      min []  Iontophoresis w/ dexamethasone, location:                                               []  take home patch       []  in clinic        min  unbilled []  Ice     []  Heat    location/position:     min []  Paraffin,  details:     min []  Vasopneumatic Device, press/temp:     min []  Whirlpool / Fluido:    If using vaso (only need to measure limb vaso being performed on)      pre-treatment girth :       post-treatment girth :       measured at (landmark location) :      min []  Other:    Skin assessment post-treatment (if applicable):    []  intact    []  redness- no adverse reaction                 []redness - adverse reaction:

## 2025-04-10 NOTE — PROGRESS NOTES
PHYSICAL / OCCUPATIONAL THERAPY - DAILY TREATMENT NOTE     Patient Name: Juan Woodson    Date: 4/10/2025    : 1965  Insurance: Payor: JOSEE / Plan: JOSEE TRENT HMO / Product Type: *No Product type* /      Patient  verified Yes     Visit #   Current / Total 7 8   Time   In / Out 12:30 1:13   Pain   In / Out 3 right knee, 5 jeison hips and lower back 4/10   Subjective Functional Status/Changes: \"My hips and lower back have been hurting since I was here last visit.\"  Patient presents to therapy today wearing a small ice pack wrapped around her right knee, though walking with normal gait mechanics.   Changes to:  Allergies, Med Hx, Sx Hx?   no       TREATMENT AREA =  Pain in right knee [M25.561]  Pain in right knee [M25.561]    If an interpreting service is utilized for treatment of this patient, the contents of this document represent the material reviewed with the patient via the .     OBJECTIVE    Therapeutic Procedures:  Tx Min Billable or 1:1 Min (if diff from Tx Min) Procedure, Rationale, Specifics   8  14686 Therapeutic Exercise (timed):  increase ROM, strength, coordination, balance, and proprioception to improve patient's ability to progress to PLOF and address remaining functional goals. (see flow sheet as applicable)    Details if applicable:       24  57729 Therapeutic Activity (timed):  use of dynamic activities replicating functional movements to increase ROM, strength, coordination, balance, and proprioception in order to improve patient's ability to progress to PLOF and address remaining functional goals.  (see flow sheet as applicable)    Details if applicable:     11  57076 Neuromuscular Re-Education (timed):  improve balance, coordination, kinesthetic sense, posture, core stability and proprioception to improve patient's ability to develop conscious control of individual muscles and awareness of position of extremities in order to progress to PLOF and address remaining

## 2025-04-14 ENCOUNTER — TELEPHONE (OUTPATIENT)
Facility: HOSPITAL | Age: 60
End: 2025-04-14

## 2025-04-14 ENCOUNTER — HOSPITAL ENCOUNTER (OUTPATIENT)
Facility: HOSPITAL | Age: 60
Setting detail: RECURRING SERIES
Discharge: HOME | End: 2025-04-17
Payer: COMMERCIAL

## 2025-04-14 PROCEDURE — 97110 THERAPEUTIC EXERCISES: CPT

## 2025-04-14 PROCEDURE — 97530 THERAPEUTIC ACTIVITIES: CPT

## 2025-04-14 NOTE — PROGRESS NOTES
Physical Therapy Discharge Instructions    [facility]  [address] [place] [address]  Ph [phone]  Fx [phone]      Patient: Juan Woodson  : 1965      Continue Home Exercise Program 1 times per day for 4-6 weeks, then decrease to 3 times per week      Continue with    [x] Ice  as needed     [x] Heat           Follow up with MD:     [x] Within 2-3 weeks if LBP has not ceased     [x] As needed for knee      Recommendations:     [x]   Return to activity with home program    []   Return to activity with the following modifications:       []Post Rehab Program    []Join Independent aquatic program     []Return to/join local gym      Additional Comments: Thank you for choosing [facility]!    Rayna Perrin, PT 2025 10:35 AM

## 2025-04-14 NOTE — PROGRESS NOTES
PHYSICAL / OCCUPATIONAL THERAPY - DAILY TREATMENT NOTE     Patient Name: Juan Woodson    Date: 2025    : 1965  Insurance: Payor: JOSEE / Plan: JOSEE TRENT HMO / Product Type: *No Product type* /      Patient  verified Yes     Visit #   Current / Total 8 8   Time   In / Out 8:31 9:07   Pain   In / Out 4-5 LBP/right knee pain 4-5 LBP/right knee pain   Subjective Functional Status/Changes: Patient states she was doing great, including walking normal, prior to her LBP flare that started about a week ago.  \"Now I'm limping when I walk, because of my back and knee pain.\"   Changes to:  Allergies, Med Hx, Sx Hx?   no       TREATMENT AREA =  Pain in right knee [M25.561]  Pain in right knee [M25.561]    If an interpreting service is utilized for treatment of this patient, the contents of this document represent the material reviewed with the patient via the .     OBJECTIVE    Therapeutic Procedures:  Tx Min Billable or 1:1 Min (if diff from Tx Min) Procedure, Rationale, Specifics   8  29666 Therapeutic Exercise (timed):  increase ROM, strength, coordination, balance, and proprioception to improve patient's ability to progress to PLOF and address remaining functional goals. (see flow sheet as applicable)    Details if applicable:       28  66874 Therapeutic Activity (timed):  use of dynamic activities replicating functional movements to increase ROM, strength, coordination, balance, and proprioception in order to improve patient's ability to progress to PLOF and address remaining functional goals.  (see flow sheet as applicable)    Details if applicable:     36  [facility] Totals Reminder: bill using total billable min of TIMED therapeutic procedures (example: do not include dry needle or estim unattended, both untimed codes, in totals to left)  8-22 min = 1 unit; 23-37 min = 2 units; 38-52 min = 3 units; 53-67 min = 4 units; 68-82 min = 5 units   Total Total     TOTAL TREATMENT TIME:        36

## 2025-04-14 NOTE — PROGRESS NOTES
[facility]  [address], [place] [address]  Phone [phone]  Fax [phone]    Physical Therapy Discharge Summary    Patient name: Juan Woodson Start of Care: 3/7/2025   Referral source: Anthony Andrade MD : [id number]               Medical Diagnosis: Pain in right knee [M25.561] Onset Date:3/4/25               Treatment Diagnosis: M25.561  RIGHT KNEE PAIN      Prior Hospitalization: see medical history Provider#: [id number]      Comorbidities: OA, chronic right knee pain including surgery in  as well as 3/7/25, epilepsy, fibromyalgia, pacemaker,   Prior Level of Function:Prior Level of Function:   [] Unrestricted with functional activities and ADL's  [x] No assistive device  [] active lifestyle, [x] moderately active lifestyle, [] sedentary lifestyle    Visits from Start of Care: 8    Missed Visits: 0    Reporting Period : 3/7/25 to 25    Assessment / Summary of Care:  Bilateral LE MMT: grossly     Patient has participated in 8 sessions of skilled PT s/p right knee scope on 3/4/25.  Patient has been with reports of increased right knee pain over the last week, which is the same time frame that her chronic intermittent LBP that she's had for years flared up again.  Patient's increase in LBP has contributed to an antalgic gait pattern over the last week, which has also altered patient's bilateral LE gait mechanics.  Albeit, before this flare patient was ambulating community distances with normal gait mechanics and no pain.  Patient has also made great improvements in restoring full right knee AROM and full bilateral LE strength.  Patient is able to negotiate stairs with a step-through pattern, though mildly antalgic during descents secondary to c/o LBP and right knee pain though overall still functional and safe.  LEFS score on initial evaluation was 20/80, which did improve up to 37/80 at best a couple of weeks ago, but now her score is down to 19/80 secondary to

## 2025-04-17 ENCOUNTER — APPOINTMENT (OUTPATIENT)
Facility: HOSPITAL | Age: 60
End: 2025-04-17
Payer: COMMERCIAL